# Patient Record
Sex: MALE | Race: WHITE | NOT HISPANIC OR LATINO | Employment: FULL TIME | URBAN - METROPOLITAN AREA
[De-identification: names, ages, dates, MRNs, and addresses within clinical notes are randomized per-mention and may not be internally consistent; named-entity substitution may affect disease eponyms.]

---

## 2018-11-07 ENCOUNTER — TELEPHONE (OUTPATIENT)
Dept: SURGERY | Facility: CLINIC | Age: 58
End: 2018-11-07

## 2018-11-07 NOTE — TELEPHONE ENCOUNTER
----- Message from Bandar Fuentes sent at 11/7/2018  9:58 AM CST -----  Contact: self   Patient want to speak with a nurse regarding upcoming appointment please call back at There are no phone numbers on file.

## 2018-11-16 ENCOUNTER — OFFICE VISIT (OUTPATIENT)
Dept: SURGERY | Facility: CLINIC | Age: 58
End: 2018-11-16
Payer: COMMERCIAL

## 2018-11-16 VITALS — HEIGHT: 70 IN | WEIGHT: 195.19 LBS | BODY MASS INDEX: 27.94 KG/M2

## 2018-11-16 DIAGNOSIS — K64.9 BLEEDING HEMORRHOIDS: Primary | ICD-10-CM

## 2018-11-16 PROCEDURE — 99243 OFF/OP CNSLTJ NEW/EST LOW 30: CPT | Mod: 25,S$GLB,, | Performed by: SURGERY

## 2018-11-16 PROCEDURE — 46600 DIAGNOSTIC ANOSCOPY SPX: CPT | Mod: S$GLB,,, | Performed by: SURGERY

## 2018-11-16 PROCEDURE — 99999 PR PBB SHADOW E&M-EST. PATIENT-LVL III: CPT | Mod: PBBFAC,,, | Performed by: SURGERY

## 2018-11-16 NOTE — PROGRESS NOTES
Subjective:       Patient ID: Enrique Yousif is a 58 y.o. male.    Chief Complaint: Hemorrhoids (Bleeding hemorrhoids)    HPI  Pleasant 59 yo M referred to me in consultation from Dr Matthews for evaluation of his hemorrhoids. Pt notes that he has been having issues with his hemorrhoids for the last several months. Notes that he frequently has bleeding in association with his hemorhroids.  NOtes that bleeding is always related to BM.  No fever/chills.  No changes in bowel habits.  Does note that tissue prolapses and at times can be painful.  Pt has had recent colonoscopy with Dr Matthews with no other findings noted.  Pt with no other medical or surgical problems  Review of Systems   Constitutional: Negative for activity change, appetite change, diaphoresis, fever and unexpected weight change.   Respiratory: Negative for cough, chest tightness and wheezing.    Cardiovascular: Negative for chest pain and palpitations.   Gastrointestinal: Positive for anal bleeding, blood in stool and rectal pain. Negative for abdominal distention, abdominal pain, constipation, diarrhea, nausea and vomiting.   Genitourinary: Negative for difficulty urinating, dysuria and hematuria.   Musculoskeletal: Negative for back pain and gait problem.   Skin: Negative for color change and wound.   Neurological: Negative for tremors and seizures.       Objective:      Physical Exam   Constitutional: He is oriented to person, place, and time. He appears well-developed and well-nourished.   HENT:   Head: Normocephalic and atraumatic.   Eyes: Pupils are equal, round, and reactive to light.   Neck: Normal range of motion. No tracheal deviation present. No thyromegaly present.   Cardiovascular: Normal rate, regular rhythm and normal heart sounds. Exam reveals no gallop and no friction rub.   No murmur heard.  Pulmonary/Chest: Effort normal and breath sounds normal. He has no wheezes. He exhibits no tenderness.   Abdominal: He exhibits no  distension and no mass. There is no tenderness. There is no rebound and no guarding.   Genitourinary:   Genitourinary Comments: Ext exam demonstrates no significant ext hemorrhoidal disease.  NIXON with normal sphincter tone. Anoscopy demonstrating large prolapsing int hemorrhoids in R ant, R post and L lateral position   Musculoskeletal: Normal range of motion.   Neurological: He is alert and oriented to person, place, and time. Coordination normal.   Skin: Skin is warm.   Psychiatric: He has a normal mood and affect.   Vitals reviewed.      Assessment:     Prolapsing bleeding hemorhroids  No diagnosis found.    Plan:       D/ w pt.  I have informed pt that he has large int hemorrhoids that are likely bleeding and prolapsing with straining. I have recommended banding as opposed to hemorrhoidectomy.  He desires to proceed with banding but has plans tomorrow.  He will RTC on Tuesday for banding

## 2018-11-16 NOTE — Clinical Note
I saw your patient, Enrique Yousif in the office.  Attached are my findings and plan.  Thank you for referring him to my office and if you have any questions please do not hesitate to call my cell [phone].Radhames Andrea

## 2018-11-20 ENCOUNTER — OFFICE VISIT (OUTPATIENT)
Dept: SURGERY | Facility: CLINIC | Age: 58
End: 2018-11-20
Payer: COMMERCIAL

## 2018-11-20 VITALS
BODY MASS INDEX: 27.21 KG/M2 | TEMPERATURE: 98 F | DIASTOLIC BLOOD PRESSURE: 78 MMHG | SYSTOLIC BLOOD PRESSURE: 137 MMHG | HEIGHT: 70 IN | WEIGHT: 190.06 LBS | HEART RATE: 84 BPM

## 2018-11-20 DIAGNOSIS — K64.9 BLEEDING HEMORRHOIDS: Primary | ICD-10-CM

## 2018-11-20 PROCEDURE — 99999 PR PBB SHADOW E&M-EST. PATIENT-LVL III: CPT | Mod: PBBFAC,,, | Performed by: SURGERY

## 2018-11-20 PROCEDURE — 99211 OFF/OP EST MAY X REQ PHY/QHP: CPT | Mod: 25,S$GLB,, | Performed by: SURGERY

## 2018-11-20 PROCEDURE — 46221 LIGATION OF HEMORRHOID(S): CPT | Mod: S$GLB,,, | Performed by: SURGERY

## 2018-11-20 NOTE — PROGRESS NOTES
Pleasant 59 yo M returns for banding.  Notes bleedind and perianal discomfort.  '    No changes since being seen last week    AFVSS  AAox3  Soft/nt/nd  Rectal: large in hemorrhoids in all columns.      A/P: Prolapsing int hemorrhoids    D/w pt. I have recommended and offered banding which he desires.      Having received informed consent pt was placed in prone jackknife position. I then placed rubber bands on the L lateral column, R Ant and R post columns without event. Bleeding was minimal and pt tolerated the procedure well.      Pt will RTC in 6 weeks for evaluation

## 2018-12-18 ENCOUNTER — OFFICE VISIT (OUTPATIENT)
Dept: SURGERY | Facility: CLINIC | Age: 58
End: 2018-12-18
Payer: COMMERCIAL

## 2018-12-18 VITALS
HEIGHT: 70 IN | TEMPERATURE: 99 F | WEIGHT: 191.13 LBS | BODY MASS INDEX: 27.36 KG/M2 | DIASTOLIC BLOOD PRESSURE: 77 MMHG | SYSTOLIC BLOOD PRESSURE: 138 MMHG | HEART RATE: 88 BPM

## 2018-12-18 DIAGNOSIS — K64.9 BLEEDING HEMORRHOIDS: Primary | ICD-10-CM

## 2018-12-18 PROCEDURE — 99212 OFFICE O/P EST SF 10 MIN: CPT | Mod: 25,S$GLB,, | Performed by: SURGERY

## 2018-12-18 PROCEDURE — 99999 PR PBB SHADOW E&M-EST. PATIENT-LVL III: CPT | Mod: PBBFAC,,, | Performed by: SURGERY

## 2018-12-18 PROCEDURE — 46221 LIGATION OF HEMORRHOID(S): CPT | Mod: S$GLB,,, | Performed by: SURGERY

## 2018-12-18 PROCEDURE — 3008F BODY MASS INDEX DOCD: CPT | Mod: CPTII,S$GLB,, | Performed by: SURGERY

## 2018-12-18 NOTE — PROGRESS NOTES
Pleasant 59 yo M whom I am familiar with.  Pt has had previous banding about 5 weeks ago.  He notes that he responded well but has had some continued bleeding.  He desires repeat banding.       Wt Readings from Last 3 Encounters:   12/18/18 86.7 kg (191 lb 2.2 oz)   11/20/18 86.2 kg (190 lb 0.6 oz)   11/16/18 88.5 kg (195 lb 3.2 oz)     Temp Readings from Last 3 Encounters:   12/18/18 98.5 °F (36.9 °C) (Oral)   11/20/18 98 °F (36.7 °C) (Oral)     BP Readings from Last 3 Encounters:   12/18/18 138/77   11/20/18 137/78     Pulse Readings from Last 3 Encounters:   12/18/18 88   11/20/18 84     AAOx3  CTA B  Soft/nt/nd  Rectal: ext exam with no significant ext hemorrhoids.  NIXON with normal sphincter tone. ANoscoyp demonstrating large int hemorrhoids greatest in the L lateral and R ant position    A/P: Large internal hemorrhoids    D/w pt.  Reviewed risks of banding today with pt.  He desires to proceed.      Having received informed consent pt was placed in prone jackknife position. I then placed rubber bands on the L lateral column, R Ant and R post columns without event. Bleeding was minimal and pt tolerated the procedure well.    Pt will RTC in 6 weeks.

## 2020-04-22 ENCOUNTER — HOSPITAL ENCOUNTER (OUTPATIENT)
Dept: PREADMISSION TESTING | Facility: HOSPITAL | Age: 60
Discharge: HOME | End: 2020-04-22
Attending: SPECIALIST
Payer: COMMERCIAL

## 2020-04-22 ENCOUNTER — HOSPITAL ENCOUNTER (OUTPATIENT)
Dept: RADIOLOGY | Facility: HOSPITAL | Age: 60
Discharge: HOME | End: 2020-04-22
Attending: SPECIALIST
Payer: COMMERCIAL

## 2020-04-22 ENCOUNTER — LAB VISIT (OUTPATIENT)
Dept: LAB | Facility: HOSPITAL | Age: 60
End: 2020-04-22
Attending: SPECIALIST
Payer: COMMERCIAL

## 2020-04-22 DIAGNOSIS — N20.1 LEFT URETERAL STONE: Primary | ICD-10-CM

## 2020-04-22 DIAGNOSIS — Z01.811 PRE-OP CHEST EXAM: ICD-10-CM

## 2020-04-22 DIAGNOSIS — Z01.818 PREOPERATIVE TESTING: ICD-10-CM

## 2020-04-22 DIAGNOSIS — Z01.811 PRE-OP CHEST EXAM: Primary | ICD-10-CM

## 2020-04-22 DIAGNOSIS — Z01.818 PRE-OP EVALUATION: ICD-10-CM

## 2020-04-22 DIAGNOSIS — Z01.818 PRE-OP EVALUATION: Primary | ICD-10-CM

## 2020-04-22 LAB
ALBUMIN SERPL BCP-MCNC: 4.4 G/DL (ref 3.5–5.2)
ALP SERPL-CCNC: 61 U/L (ref 55–135)
ALT SERPL W/O P-5'-P-CCNC: 24 U/L (ref 10–44)
ANION GAP SERPL CALC-SCNC: 7 MMOL/L (ref 8–16)
APTT PPP: 29.8 SEC (ref 23.6–33.3)
AST SERPL-CCNC: 25 U/L (ref 10–40)
BACTERIA #/AREA URNS HPF: NEGATIVE /HPF
BASOPHILS # BLD AUTO: 0.03 K/UL (ref 0–0.2)
BASOPHILS NFR BLD: 0.3 % (ref 0–1.9)
BILIRUB SERPL-MCNC: 0.7 MG/DL (ref 0.1–1)
BILIRUB UR QL STRIP: NEGATIVE
BUN SERPL-MCNC: 20 MG/DL (ref 8–23)
CALCIUM SERPL-MCNC: 9.6 MG/DL (ref 8.7–10.5)
CHLORIDE SERPL-SCNC: 107 MMOL/L (ref 95–110)
CLARITY UR: CLEAR
CO2 SERPL-SCNC: 31 MMOL/L (ref 23–29)
COLOR UR: YELLOW
CREAT SERPL-MCNC: 1.2 MG/DL (ref 0.5–1.4)
DIFFERENTIAL METHOD: ABNORMAL
EOSINOPHIL # BLD AUTO: 0.2 K/UL (ref 0–0.5)
EOSINOPHIL NFR BLD: 2.3 % (ref 0–8)
ERYTHROCYTE [DISTWIDTH] IN BLOOD BY AUTOMATED COUNT: 12.8 % (ref 11.5–14.5)
EST. GFR  (AFRICAN AMERICAN): >60 ML/MIN/1.73 M^2
EST. GFR  (NON AFRICAN AMERICAN): >60 ML/MIN/1.73 M^2
GLUCOSE SERPL-MCNC: 126 MG/DL (ref 70–110)
GLUCOSE UR QL STRIP: NEGATIVE
HCT VFR BLD AUTO: 40.9 % (ref 40–54)
HGB BLD-MCNC: 13.6 G/DL (ref 14–18)
HGB UR QL STRIP: ABNORMAL
HYALINE CASTS #/AREA URNS LPF: 19 /LPF
IMM GRANULOCYTES # BLD AUTO: 0.04 K/UL (ref 0–0.04)
IMM GRANULOCYTES NFR BLD AUTO: 0.5 % (ref 0–0.5)
INR PPP: 1.1
KETONES UR QL STRIP: NEGATIVE
LEUKOCYTE ESTERASE UR QL STRIP: NEGATIVE
LYMPHOCYTES # BLD AUTO: 1.8 K/UL (ref 1–4.8)
LYMPHOCYTES NFR BLD: 20.7 % (ref 18–48)
MCH RBC QN AUTO: 30.4 PG (ref 27–31)
MCHC RBC AUTO-ENTMCNC: 33.3 G/DL (ref 32–36)
MCV RBC AUTO: 91 FL (ref 82–98)
MICROSCOPIC COMMENT: ABNORMAL
MONOCYTES # BLD AUTO: 0.7 K/UL (ref 0.3–1)
MONOCYTES NFR BLD: 8 % (ref 4–15)
NEUTROPHILS # BLD AUTO: 6 K/UL (ref 1.8–7.7)
NEUTROPHILS NFR BLD: 68.2 % (ref 38–73)
NITRITE UR QL STRIP: NEGATIVE
NRBC BLD-RTO: 0 /100 WBC
PH UR STRIP: 6 [PH] (ref 5–8)
PLATELET # BLD AUTO: 192 K/UL (ref 150–350)
PMV BLD AUTO: 9.7 FL (ref 9.2–12.9)
POTASSIUM SERPL-SCNC: 3.8 MMOL/L (ref 3.5–5.1)
PROT SERPL-MCNC: 7.4 G/DL (ref 6–8.4)
PROT UR QL STRIP: ABNORMAL
PROTHROMBIN TIME: 13.3 SEC (ref 10.6–14.8)
RBC # BLD AUTO: 4.48 M/UL (ref 4.6–6.2)
RBC #/AREA URNS HPF: 4 /HPF (ref 0–4)
SODIUM SERPL-SCNC: 145 MMOL/L (ref 136–145)
SP GR UR STRIP: 1.02 (ref 1–1.03)
SQUAMOUS #/AREA URNS HPF: 3 /HPF
URN SPEC COLLECT METH UR: ABNORMAL
UROBILINOGEN UR STRIP-ACNC: NEGATIVE EU/DL
WBC # BLD AUTO: 8.82 K/UL (ref 3.9–12.7)
WBC #/AREA URNS HPF: 11 /HPF (ref 0–5)

## 2020-04-22 PROCEDURE — 93005 ELECTROCARDIOGRAM TRACING: CPT | Performed by: INTERNAL MEDICINE

## 2020-04-22 PROCEDURE — 71046 X-RAY EXAM CHEST 2 VIEWS: CPT | Mod: TC

## 2020-04-22 PROCEDURE — 36415 COLL VENOUS BLD VENIPUNCTURE: CPT

## 2020-04-22 PROCEDURE — 85730 THROMBOPLASTIN TIME PARTIAL: CPT

## 2020-04-22 PROCEDURE — 80053 COMPREHEN METABOLIC PANEL: CPT

## 2020-04-22 PROCEDURE — 85025 COMPLETE CBC W/AUTO DIFF WBC: CPT

## 2020-04-22 PROCEDURE — 85610 PROTHROMBIN TIME: CPT

## 2020-04-22 PROCEDURE — 81001 URINALYSIS AUTO W/SCOPE: CPT

## 2020-04-22 RX ORDER — DOXYCYCLINE 100 MG/1
100 CAPSULE ORAL 2 TIMES DAILY
COMMUNITY
End: 2020-06-02

## 2020-04-22 RX ORDER — ONDANSETRON HYDROCHLORIDE 8 MG/1
8 TABLET, FILM COATED ORAL
COMMUNITY
End: 2020-06-02

## 2020-04-22 RX ORDER — TAMSULOSIN HYDROCHLORIDE 0.4 MG/1
0.4 CAPSULE ORAL DAILY
COMMUNITY
End: 2020-06-02

## 2020-04-22 RX ORDER — ACETAMINOPHEN AND CODEINE PHOSPHATE 300; 60 MG/1; MG/1
1 TABLET ORAL EVERY 4 HOURS PRN
COMMUNITY
End: 2020-05-06

## 2020-04-22 NOTE — DISCHARGE INSTRUCTIONS
To confirm, Your doctor has instructed you that surgery is scheduled for:     Please report to [facility] on [address] the morning of surgery.     Pre-Op will call the afternoon prior to surgery between 4:00 and 6:00 PM with the final arrival time.      PLEASE NOTE:  The surgery schedule has many variables which may affect the time of your surgery case.  Family members should be available if your surgery time changes.  Plan to be here the day of your procedure between 4-6 hours.    MEDICATIONS:  TAKE ONLY THESE MEDICATIONS WITH A SMALL SIP OF WATER THE MORNING OF YOUR PROCEDURE:    Tylenol with codeine, Zofran and Flomax      DO NOT TAKE THESE MEDICATIONS 5-7 DAYS PRIOR to your procedure or per your surgeon's request: ASPIRIN, ALEVE, ADVIL, IBUPROFEN, FISH OIL VITAMIN E, HERBALS  (May take Tylenol)    ONLY if you are prescribed any types of blood thinners such as:  Aspirin, Coumadin, Plavix, Pradaxa, Xarelto, Aggrenox, Effient, Eliquis, Savasya, Brilinta, or any other, ask your surgeon whether you should stop taking them and how long before surgery you should stop.  You may also need to verify with the prescribing physician if it is ok to stop your medication.      INSTRUCTIONS IMPORTANT!!  · Do not eat or drink anything between midnight and the time of your procedure- this includes gum, mints, and candy.  · ONLY if you are diabetic, check your sugar in the morning before your procedure.  · Do not smoke, vape or drink alcoholic beverages 24 hours prior to your procedure.  · Shower the night before AND the morning of your procedure with a Chlorhexidine wash such as Hibiclens or Dial antibacterial soap from the neck down.  Do not get it on your face or in your eyes.  You may use your own shampoo and face wash. This helps your skin to be as bacteria free as possible.    · If you wear contact lenses, dentures, hearing aids or glasses, bring a container to put them in during surgery and give to a family member  for safe keeping.  Please leave all jewelry, piercing's and valuables at home.   · DO NOT remove hair from the surgery site.  Do not shave the incision site unless you are given specific instructions to do so.    · ONLY if you have been diagnosed with sleep apnea please bring your C-PAP machine.  · ONLY if you wear home oxygen please bring your portable oxygen tank the day of your procedure.   · ONLY for patients requiring bowel prep, written instructions will be given by your doctor's office.  · ONLY if you have a neuro stimulator, please bring the controller with you the morning of surgery  · ONLY if a type and screen test is needed before surgery, please return:  · If your doctor has scheduled you for an overnight stay, bring a small overnight bag with any personal items you need.  · Make arrangements in advance for transportation home by a responsible adult.  · You must make arrangements for transportation, TAXI'S, UBER'S OR LYFTS ARE NOT ALLOWED.          If you have any questions about these instructions, call Pre-Op Admit  Nursing at [phone] or the Pre-Op Day Surgery Unit at [phone].

## 2020-04-23 ENCOUNTER — ANESTHESIA EVENT (OUTPATIENT)
Dept: SURGERY | Facility: HOSPITAL | Age: 60
End: 2020-04-23
Payer: COMMERCIAL

## 2020-04-23 ENCOUNTER — HOSPITAL ENCOUNTER (OUTPATIENT)
Dept: RADIOLOGY | Facility: HOSPITAL | Age: 60
Discharge: HOME | End: 2020-04-23
Attending: SPECIALIST | Admitting: SPECIALIST
Payer: COMMERCIAL

## 2020-04-23 ENCOUNTER — HOSPITAL ENCOUNTER (OUTPATIENT)
Facility: HOSPITAL | Age: 60
Discharge: HOME | End: 2020-04-23
Attending: SPECIALIST | Admitting: SPECIALIST
Payer: COMMERCIAL

## 2020-04-23 ENCOUNTER — ANESTHESIA (OUTPATIENT)
Dept: SURGERY | Facility: HOSPITAL | Age: 60
End: 2020-04-23
Payer: COMMERCIAL

## 2020-04-23 VITALS
SYSTOLIC BLOOD PRESSURE: 136 MMHG | TEMPERATURE: 98 F | HEART RATE: 82 BPM | HEIGHT: 70 IN | OXYGEN SATURATION: 96 % | DIASTOLIC BLOOD PRESSURE: 76 MMHG | WEIGHT: 189.81 LBS | BODY MASS INDEX: 27.17 KG/M2 | RESPIRATION RATE: 18 BRPM

## 2020-04-23 DIAGNOSIS — N20.0 KIDNEY STONE: ICD-10-CM

## 2020-04-23 DIAGNOSIS — N20.1 LEFT URETERAL STONE: Primary | ICD-10-CM

## 2020-04-23 LAB — SARS-COV-2 RDRP RESP QL NAA+PROBE: NEGATIVE

## 2020-04-23 PROCEDURE — 71000015 HC POSTOP RECOV 1ST HR: Performed by: SPECIALIST

## 2020-04-23 PROCEDURE — 36000706: Performed by: SPECIALIST

## 2020-04-23 PROCEDURE — 63600175 PHARM REV CODE 636 W HCPCS: Performed by: NURSE ANESTHETIST, CERTIFIED REGISTERED

## 2020-04-23 PROCEDURE — 25000003 PHARM REV CODE 250: Performed by: NURSE ANESTHETIST, CERTIFIED REGISTERED

## 2020-04-23 PROCEDURE — 27202103: Performed by: ANESTHESIOLOGY

## 2020-04-23 PROCEDURE — 27201107 HC STYLET, STANDARD: Performed by: ANESTHESIOLOGY

## 2020-04-23 PROCEDURE — U0002 COVID-19 LAB TEST NON-CDC: HCPCS

## 2020-04-23 PROCEDURE — 27000671 HC TUBING MICROBORE EXT: Performed by: ANESTHESIOLOGY

## 2020-04-23 PROCEDURE — 27000673 HC TUBING BLOOD Y: Performed by: ANESTHESIOLOGY

## 2020-04-23 PROCEDURE — C1769 GUIDE WIRE: HCPCS | Performed by: SPECIALIST

## 2020-04-23 PROCEDURE — 82360 CALCULUS ASSAY QUANT: CPT

## 2020-04-23 PROCEDURE — 37000009 HC ANESTHESIA EA ADD 15 MINS: Performed by: SPECIALIST

## 2020-04-23 PROCEDURE — 37000008 HC ANESTHESIA 1ST 15 MINUTES: Performed by: SPECIALIST

## 2020-04-23 PROCEDURE — 63600175 PHARM REV CODE 636 W HCPCS: Performed by: SPECIALIST

## 2020-04-23 PROCEDURE — 27201423 OPTIME MED/SURG SUP & DEVICES STERILE SUPPLY: Performed by: SPECIALIST

## 2020-04-23 PROCEDURE — 71000033 HC RECOVERY, INTIAL HOUR: Performed by: SPECIALIST

## 2020-04-23 PROCEDURE — S0028 INJECTION, FAMOTIDINE, 20 MG: HCPCS | Performed by: NURSE ANESTHETIST, CERTIFIED REGISTERED

## 2020-04-23 PROCEDURE — 36000707: Performed by: SPECIALIST

## 2020-04-23 PROCEDURE — 71000016 HC POSTOP RECOV ADDL HR: Performed by: SPECIALIST

## 2020-04-23 PROCEDURE — 74420 UROGRAPHY RTRGR +-KUB: CPT | Mod: TC

## 2020-04-23 PROCEDURE — 27000654 HC CATH IV JELCO: Performed by: ANESTHESIOLOGY

## 2020-04-23 PROCEDURE — C2617 STENT, NON-COR, TEM W/O DEL: HCPCS | Performed by: SPECIALIST

## 2020-04-23 DEVICE — STENT URETERAL FIRM 4.8FX26 ASCERTA: Type: IMPLANTABLE DEVICE | Site: URETER | Status: FUNCTIONAL

## 2020-04-23 RX ORDER — MIDAZOLAM HYDROCHLORIDE 1 MG/ML
INJECTION INTRAMUSCULAR; INTRAVENOUS
Status: DISCONTINUED | OUTPATIENT
Start: 2020-04-23 | End: 2020-04-23

## 2020-04-23 RX ORDER — FENTANYL CITRATE 50 UG/ML
INJECTION, SOLUTION INTRAMUSCULAR; INTRAVENOUS
Status: DISCONTINUED | OUTPATIENT
Start: 2020-04-23 | End: 2020-04-23

## 2020-04-23 RX ORDER — ACETAMINOPHEN 10 MG/ML
INJECTION, SOLUTION INTRAVENOUS
Status: DISCONTINUED | OUTPATIENT
Start: 2020-04-23 | End: 2020-04-23

## 2020-04-23 RX ORDER — LIDOCAINE HYDROCHLORIDE 20 MG/ML
INJECTION, SOLUTION EPIDURAL; INFILTRATION; INTRACAUDAL; PERINEURAL
Status: DISCONTINUED | OUTPATIENT
Start: 2020-04-23 | End: 2020-04-23

## 2020-04-23 RX ORDER — SODIUM CHLORIDE, SODIUM LACTATE, POTASSIUM CHLORIDE, CALCIUM CHLORIDE 600; 310; 30; 20 MG/100ML; MG/100ML; MG/100ML; MG/100ML
INJECTION, SOLUTION INTRAVENOUS CONTINUOUS PRN
Status: DISCONTINUED | OUTPATIENT
Start: 2020-04-23 | End: 2020-04-23

## 2020-04-23 RX ORDER — ONDANSETRON 2 MG/ML
INJECTION INTRAMUSCULAR; INTRAVENOUS
Status: DISCONTINUED | OUTPATIENT
Start: 2020-04-23 | End: 2020-04-23

## 2020-04-23 RX ORDER — HYDROCODONE BITARTRATE AND ACETAMINOPHEN 5; 325 MG/1; MG/1
1 TABLET ORAL EVERY 4 HOURS PRN
Qty: 14 TABLET | Refills: 0
Start: 2020-04-23 | End: 2020-06-02

## 2020-04-23 RX ORDER — HYDROCODONE BITARTRATE AND ACETAMINOPHEN 5; 325 MG/1; MG/1
1 TABLET ORAL EVERY 4 HOURS PRN
Status: DISCONTINUED | OUTPATIENT
Start: 2020-04-23 | End: 2020-04-23 | Stop reason: HOSPADM

## 2020-04-23 RX ORDER — SUCCINYLCHOLINE CHLORIDE 20 MG/ML
INJECTION INTRAMUSCULAR; INTRAVENOUS
Status: DISCONTINUED | OUTPATIENT
Start: 2020-04-23 | End: 2020-04-23

## 2020-04-23 RX ORDER — PROPOFOL 10 MG/ML
VIAL (ML) INTRAVENOUS
Status: DISCONTINUED | OUTPATIENT
Start: 2020-04-23 | End: 2020-04-23

## 2020-04-23 RX ORDER — ROCURONIUM BROMIDE 10 MG/ML
INJECTION, SOLUTION INTRAVENOUS
Status: DISCONTINUED | OUTPATIENT
Start: 2020-04-23 | End: 2020-04-23

## 2020-04-23 RX ORDER — FAMOTIDINE 10 MG/ML
INJECTION INTRAVENOUS
Status: DISCONTINUED | OUTPATIENT
Start: 2020-04-23 | End: 2020-04-23

## 2020-04-23 RX ORDER — LEVOFLOXACIN 5 MG/ML
500 INJECTION, SOLUTION INTRAVENOUS ONCE
Status: COMPLETED | OUTPATIENT
Start: 2020-04-23 | End: 2020-04-23

## 2020-04-23 RX ORDER — LIDOCAINE HYDROCHLORIDE 20 MG/ML
JELLY TOPICAL
Status: DISCONTINUED | OUTPATIENT
Start: 2020-04-23 | End: 2020-04-23

## 2020-04-23 RX ADMIN — ROCURONIUM BROMIDE 5 MG: 10 INJECTION, SOLUTION INTRAVENOUS at 11:04

## 2020-04-23 RX ADMIN — PROPOFOL 150 MG: 10 INJECTION, EMULSION INTRAVENOUS at 11:04

## 2020-04-23 RX ADMIN — ACETAMINOPHEN 1000 MG: 10 INJECTION, SOLUTION INTRAVENOUS at 11:04

## 2020-04-23 RX ADMIN — FENTANYL CITRATE 50 MCG: 50 INJECTION INTRAMUSCULAR; INTRAVENOUS at 11:04

## 2020-04-23 RX ADMIN — SODIUM CHLORIDE, SODIUM LACTATE, POTASSIUM CHLORIDE, AND CALCIUM CHLORIDE: .6; .31; .03; .02 INJECTION, SOLUTION INTRAVENOUS at 10:04

## 2020-04-23 RX ADMIN — SUCCINYLCHOLINE CHLORIDE 120 MG: 20 INJECTION, SOLUTION INTRAMUSCULAR; INTRAVENOUS at 11:04

## 2020-04-23 RX ADMIN — FAMOTIDINE 20 MG: 10 INJECTION, SOLUTION INTRAVENOUS at 11:04

## 2020-04-23 RX ADMIN — LIDOCAINE HYDROCHLORIDE 100 MG: 20 INJECTION, SOLUTION INTRAVENOUS at 11:04

## 2020-04-23 RX ADMIN — LIDOCAINE HYDROCHLORIDE 3 ML: 20 JELLY TOPICAL at 11:04

## 2020-04-23 RX ADMIN — ONDANSETRON 4 MG: 2 INJECTION INTRAMUSCULAR; INTRAVENOUS at 10:04

## 2020-04-23 RX ADMIN — LEVOFLOXACIN 500 MG: 500 INJECTION, SOLUTION INTRAVENOUS at 10:04

## 2020-04-23 RX ADMIN — MIDAZOLAM HYDROCHLORIDE 2 MG: 1 INJECTION, SOLUTION INTRAMUSCULAR; INTRAVENOUS at 10:04

## 2020-04-23 NOTE — TRANSFER OF CARE
"Anesthesia Transfer of Care Note    Patient: Enrique Yousif    Procedure(s) Performed: Procedure(s) (LRB):  CYSTOSCOPY, WITH CALCULUS REMOVAL (Left)  URETEROSCOPY (N/A)    Patient location: PACU    Anesthesia Type: general    Transport from OR: Transported from OR on room air with adequate spontaneous ventilation    Post pain: adequate analgesia    Post assessment: no apparent anesthetic complications and tolerated procedure well    Post vital signs: stable    Level of consciousness: awake, alert and oriented    Nausea/Vomiting: no nausea/vomiting    Complications: none    Transfer of care protocol was followed      Last vitals:   Visit Vitals  BP (!) 149/92 (BP Location: Right arm, Patient Position: Lying)   Pulse 86   Temp 36.8 °C (98.2 °F) (Oral)   Resp 20   Ht 5' 10" (1.778 m)   Wt 86.1 kg (189 lb 13.1 oz)   SpO2 98%   BMI 27.24 kg/m²     "

## 2020-04-23 NOTE — ANESTHESIA PREPROCEDURE EVALUATION
04/23/2020  Enrique Yousif is a 59 y.o., male.    There is no problem list on file for this patient.      Past Surgical History:   Procedure Laterality Date    APPENDECTOMY  23yrs old    Right Knee Orthoscopic Surgery  2014    Right Shoulder Rotator Cuff  2014    TONSILLECTOMY  Age 33yrs old        Tobacco Use:  The patient  reports that he has never smoked. He has never used smokeless tobacco.     Results for orders placed or performed during the hospital encounter of 04/22/20   EKG 12-lead    Collection Time: 04/22/20  3:42 PM    Narrative    Test Reason : Z01.818,    Vent. Rate : 100 BPM     Atrial Rate : 100 BPM     P-R Int : 192 ms          QRS Dur : 128 ms      QT Int : 372 ms       P-R-T Axes : 080 072 063 degrees     QTc Int : 479 ms    Normal sinus rhythm  Right bundle branch block  Abnormal ECG  No previous ECGs available  Confirmed by Osvaldo Espinal MD ([id number]) on 4/22/2020 6:20:36 PM    Referred By: ALEJANDRA ACOSTA           Confirmed By:Osvaldo Espinal MD             Lab Results   Component Value Date    WBC 8.82 04/22/2020    HGB 13.6 (L) 04/22/2020    HCT 40.9 04/22/2020    MCV 91 04/22/2020     04/22/2020     BMP  Lab Results   Component Value Date     04/22/2020    K 3.8 04/22/2020     04/22/2020    CO2 31 (H) 04/22/2020    BUN 20 04/22/2020    CREATININE 1.2 04/22/2020    CALCIUM 9.6 04/22/2020    ANIONGAP 7 (L) 04/22/2020    ESTGFRAFRICA >60.0 04/22/2020    EGFRNONAA >60.0 04/22/2020             Pre-op Assessment    I have reviewed the Patient Summary Reports.    I have reviewed the Nursing Notes.   I have reviewed the Medications.     Review of Systems  Anesthesia Hx:  No problems with previous Anesthesia  History of prior surgery of interest to airway management or planning: Previous anesthesia: General Denies Family Hx of Anesthesia complications.   Denies  Personal Hx of Anesthesia complications.   Social:  Non-Smoker    Hematology/Oncology:  Hematology Normal        Cardiovascular:  Cardiovascular Normal     Pulmonary:  Pulmonary Normal    Renal/:   renal calculi    Hepatic/GI:  Hepatic/GI Normal    Musculoskeletal:  Musculoskeletal Normal    Neurological:  Neurology Normal    Endocrine:  Endocrine Normal        Physical Exam  General:  Well nourished    Airway/Jaw/Neck:  Airway Findings: Mouth Opening: Normal Tongue: Normal  General Airway Assessment: Adult  Mallampati: III  TM Distance: Normal, at least 6 cm  Jaw/Neck Findings:  Neck ROM: Normal ROM       Chest/Lungs:  Chest/Lungs Findings: Clear to auscultation, Normal Respiratory Rate     Heart/Vascular:  Heart Findings: Rate: Normal  Rhythm: Regular Rhythm  Sounds: Normal     Abdomen:  Abdomen Findings: Normal    Musculoskeletal:  Musculoskeletal Findings: Normal   Skin:  Skin Findings: Normal    Mental Status:  Mental Status Findings:  Cooperative, Alert and Oriented         Anesthesia Plan  Type of Anesthesia, risks & benefits discussed:  Anesthesia Type:  general  Patient's Preference:   Intra-op Monitoring Plan: standard ASA monitors  Intra-op Monitoring Plan Comments:   Post Op Pain Control Plan: multimodal analgesia  Post Op Pain Control Plan Comments:   Induction:   IV  Beta Blocker:  Patient is not currently on a Beta-Blocker (No further documentation required).       Informed Consent: Patient understands risks and agrees with Anesthesia plan.  Questions answered. Anesthesia consent signed with patient.  ASA Score: 2     Day of Surgery Review of History & Physical: I have interviewed and examined the patient. I have reviewed the patient's H&P dated:  There are no significant changes.      Anesthesia Plan Notes: RIGOBERTO Renteria intra op  Zofran

## 2020-04-23 NOTE — ANESTHESIA POSTPROCEDURE EVALUATION
Anesthesia Post Evaluation    Patient: Enrique Yousif    Procedure(s) Performed: Procedure(s) (LRB):  CYSTOSCOPY, WITH CALCULUS REMOVAL (Left)  URETEROSCOPY (N/A)    Final Anesthesia Type: general    Patient location during evaluation: PACU  Patient participation: Yes- Able to Participate  Level of consciousness: awake and alert and oriented  Post-procedure vital signs: reviewed and stable  Pain management: adequate  Airway patency: patent    PONV status at discharge: No PONV  Anesthetic complications: no      Cardiovascular status: blood pressure returned to baseline and hemodynamically stable  Respiratory status: unassisted, spontaneous ventilation and room air  Hydration status: euvolemic  Follow-up not needed.          Vitals Value Taken Time   /87 4/23/2020 12:00 PM   Temp 36.4 °C (97.5 °F) 4/23/2020 12:00 PM   Pulse 86 4/23/2020 12:05 PM   Resp 14 4/23/2020 12:05 PM   SpO2 98 % 4/23/2020 12:05 PM   Vitals shown include unvalidated device data.      No case tracking events are documented in the log.      Pain/Valeria Score: Valeria Score: 10 (4/23/2020 11:45 AM)

## 2020-04-23 NOTE — DISCHARGE SUMMARY
Patient comes in for ureteroscopic stone extraction  The stone is removed easily  Stent is left in place  Procedure is done w no complication    After a brief stay in recovery, patient is discharged in good condition    Meds given:  Lortab    F/erick office:  1 week with lou MURPHY

## 2020-04-23 NOTE — OP NOTE
Operative Note         SUMMARY     Surgery Date:  4/23/2020     Assistant:     Indications:  59-year-old male with left ureteral stone  Here for stone extraction  He continues to have pain and discomfort      Pre-op Diagnosis:   Left ureteral stone    Post-op Diagnosis:  Same    Procedure:  Cystoscopy with cysto ureteral stone extraction  Double-J stent    Anesthesia: General    Description of Procedure:   Patient brought to cystoscopy suite.  Placed in the cystoscopy position.  Patient is prepped and draped.  Anesthesia is given.  We enter the urinary bladder with the 21 fr cystoscope.  Bladder is inspected no lesions were found  We cannulate the left ureter with a cone-tip catheter and inject contrast  Filling defect is identified  Stone is extracted with a basket  We then leave a stent in  Stent is placed over the wire  Position is excellent  Telescope was removed    Findings/Key Components:      Successful stone extraction  Plan a follow-up in a week to remove the stent    Estimated Blood Loss:        None

## 2020-04-23 NOTE — ANESTHESIA PROCEDURE NOTES
Intubation  Performed by: Elizabeth Servin CRNA  Authorized by: Kyle Sage MD     Intubation:     Induction:  Intravenous    Intubated:  Postinduction    Mask Ventilation:  Easy mask    Attempts:  1    Attempted By:  CRNA    Method of Intubation:  Direct    Blade:  Sage 2    Laryngeal View Grade: Grade I - full view of chords      Difficult Airway Encountered?: No      Complications:  None    Airway Device:  Oral endotracheal tube    Airway Device Size:  7.5    Style/Cuff Inflation:  Cuffed    Tube secured:  23    Secured at:  The lips    Placement Verified By:  Capnometry    Complicating Factors:  None    Findings Post-Intubation:  BS equal bilateral and atraumatic/condition of teeth unchanged

## 2020-04-23 NOTE — H&P
[facility]  History & Physical    SUBJECTIVE:     Chief Complaint/Reason for Admission:     History of Present Illness:  Patient is a 59 y.o. male presents with left ureteral stone  Pain for several days  Here for stone extraction    PTA Medications   Medication Sig    acetaminophen-codeine 300-60mg (TYLENOL #4) 300-60 mg Tab Take 1 tablet by mouth every 4 (four) hours as needed.    doxycycline (VIBRAMYCIN) 100 MG Cap Take 100 mg by mouth 2 (two) times daily.    Lactobacillus rhamnosus GG (CULTURELLE) 10 billion cell capsule Take 1 capsule by mouth once daily.    ondansetron (ZOFRAN) 8 MG tablet Take 8 mg by mouth every 8 (eight) hours.    tamsulosin (FLOMAX) 0.4 mg Cap Take 0.4 mg by mouth once daily.       Review of patient's allergies indicates:   Allergen Reactions    Penicillins Other (See Comments)     Flushed       History reviewed. No pertinent past medical history.  Past Surgical History:   Procedure Laterality Date    APPENDECTOMY  23yrs old    Right Knee Orthoscopic Surgery  2014    Right Shoulder Rotator Cuff  2014    TONSILLECTOMY  Age 33yrs old     History reviewed. No pertinent family history.  Social History     Tobacco Use    Smoking status: Never Smoker    Smokeless tobacco: Never Used   Substance Use Topics    Alcohol use: Not Currently    Drug use: Not on file        Review of Systems:  Negative    OBJECTIVE:     Vital Signs (Most Recent):  Temp: 98.3 °F (36.8 °C) (04/23/20 0914)  Pulse: 90 (04/23/20 0914)  Resp: 18 (04/23/20 0914)  BP: (!) 142/86 (04/23/20 0919)  SpO2: 96 % (04/23/20 0914)    Inpatient Medications:  Current Facility-Administered Medications   Medication Dose Route Frequency Provider Last Rate Last Dose    levoFLOXacin 500 mg/100 mL IVPB 500 mg  500 mg Intravenous Once Derian Aguillon MD              ASSESSMENT/PLAN:       Left distal ureteral stone      Cysto stone extraction

## 2020-05-04 LAB
CALCULI COMPOSITION: NORMAL
CALCULI DISCLAIMER: NORMAL
CALCULI, PLEASE NOTE: NORMAL
COLOR STONE: NORMAL
COM MFR STONE: 100 %
LABORATORY COMMENT REPORT: NORMAL
PHOTO: NORMAL
SIZE STONE: NORMAL MM
WT STONE: 38 MG

## 2020-05-05 DIAGNOSIS — N20.1 CALCULUS OF URETER: Primary | ICD-10-CM

## 2020-05-06 ENCOUNTER — HOSPITAL ENCOUNTER (OUTPATIENT)
Dept: RADIOLOGY | Facility: HOSPITAL | Age: 60
Discharge: HOME | End: 2020-05-06
Attending: SPECIALIST
Payer: COMMERCIAL

## 2020-05-06 DIAGNOSIS — N20.1 CALCULUS OF URETER: ICD-10-CM

## 2020-05-06 PROCEDURE — 74018 RADEX ABDOMEN 1 VIEW: CPT | Mod: TC,PO

## 2020-05-07 ENCOUNTER — ANESTHESIA (OUTPATIENT)
Dept: SURGERY | Facility: HOSPITAL | Age: 60
End: 2020-05-07
Payer: COMMERCIAL

## 2020-05-07 ENCOUNTER — ANESTHESIA EVENT (OUTPATIENT)
Dept: SURGERY | Facility: HOSPITAL | Age: 60
End: 2020-05-07
Payer: COMMERCIAL

## 2020-05-07 ENCOUNTER — HOSPITAL ENCOUNTER (OUTPATIENT)
Facility: HOSPITAL | Age: 60
Discharge: HOME | End: 2020-05-07
Attending: SPECIALIST | Admitting: SPECIALIST
Payer: COMMERCIAL

## 2020-05-07 DIAGNOSIS — N20.0 RENAL STONES: Primary | ICD-10-CM

## 2020-05-07 LAB — SARS-COV-2 RDRP RESP QL NAA+PROBE: NEGATIVE

## 2020-05-07 PROCEDURE — 27200651 HC AIRWAY, LMA: Performed by: ANESTHESIOLOGY

## 2020-05-07 PROCEDURE — 37000008 HC ANESTHESIA 1ST 15 MINUTES: Performed by: SPECIALIST

## 2020-05-07 PROCEDURE — 27000671 HC TUBING MICROBORE EXT: Performed by: ANESTHESIOLOGY

## 2020-05-07 PROCEDURE — 71000015 HC POSTOP RECOV 1ST HR: Performed by: SPECIALIST

## 2020-05-07 PROCEDURE — 27202107 HC XP QUATRO SENSOR: Performed by: ANESTHESIOLOGY

## 2020-05-07 PROCEDURE — 27000284 HC CANNULA NASAL: Performed by: ANESTHESIOLOGY

## 2020-05-07 PROCEDURE — 63600175 PHARM REV CODE 636 W HCPCS: Performed by: NURSE ANESTHETIST, CERTIFIED REGISTERED

## 2020-05-07 PROCEDURE — 37000009 HC ANESTHESIA EA ADD 15 MINS: Performed by: SPECIALIST

## 2020-05-07 PROCEDURE — 71000033 HC RECOVERY, INTIAL HOUR: Performed by: SPECIALIST

## 2020-05-07 PROCEDURE — U0002 COVID-19 LAB TEST NON-CDC: HCPCS

## 2020-05-07 PROCEDURE — 36000704 HC OR TIME LEV I 1ST 15 MIN: Performed by: SPECIALIST

## 2020-05-07 PROCEDURE — 71000039 HC RECOVERY, EACH ADD'L HOUR: Performed by: SPECIALIST

## 2020-05-07 PROCEDURE — 27000673 HC TUBING BLOOD Y: Performed by: ANESTHESIOLOGY

## 2020-05-07 PROCEDURE — 25000003 PHARM REV CODE 250: Performed by: ANESTHESIOLOGY

## 2020-05-07 PROCEDURE — 25000003 PHARM REV CODE 250: Performed by: NURSE ANESTHETIST, CERTIFIED REGISTERED

## 2020-05-07 PROCEDURE — 36000705 HC OR TIME LEV I EA ADD 15 MIN: Performed by: SPECIALIST

## 2020-05-07 RX ORDER — LIDOCAINE HCL/PF 100 MG/5ML
SYRINGE (ML) INTRAVENOUS
Status: DISCONTINUED | OUTPATIENT
Start: 2020-05-07 | End: 2020-05-07

## 2020-05-07 RX ORDER — PROPOFOL 10 MG/ML
INJECTION, EMULSION INTRAVENOUS
Status: DISCONTINUED | OUTPATIENT
Start: 2020-05-07 | End: 2020-05-07

## 2020-05-07 RX ORDER — DIPHENHYDRAMINE HYDROCHLORIDE 50 MG/ML
12.5 INJECTION INTRAMUSCULAR; INTRAVENOUS EVERY 4 HOURS
Status: DISCONTINUED | OUTPATIENT
Start: 2020-05-07 | End: 2020-05-07 | Stop reason: HOSPADM

## 2020-05-07 RX ORDER — HYDROCODONE BITARTRATE AND ACETAMINOPHEN 5; 325 MG/1; MG/1
1 TABLET ORAL EVERY 4 HOURS PRN
Qty: 12 TABLET | Refills: 0
Start: 2020-05-07 | End: 2020-06-02

## 2020-05-07 RX ORDER — FENTANYL CITRATE 50 UG/ML
25 INJECTION, SOLUTION INTRAMUSCULAR; INTRAVENOUS
Status: DISCONTINUED | OUTPATIENT
Start: 2020-05-07 | End: 2020-05-07 | Stop reason: HOSPADM

## 2020-05-07 RX ORDER — ACETAMINOPHEN 10 MG/ML
INJECTION, SOLUTION INTRAVENOUS
Status: DISCONTINUED | OUTPATIENT
Start: 2020-05-07 | End: 2020-05-07

## 2020-05-07 RX ORDER — OXYCODONE HYDROCHLORIDE 5 MG/1
5 TABLET ORAL
Status: DISCONTINUED | OUTPATIENT
Start: 2020-05-07 | End: 2020-05-07 | Stop reason: HOSPADM

## 2020-05-07 RX ORDER — FENTANYL CITRATE 50 UG/ML
INJECTION, SOLUTION INTRAMUSCULAR; INTRAVENOUS
Status: DISCONTINUED | OUTPATIENT
Start: 2020-05-07 | End: 2020-05-07

## 2020-05-07 RX ORDER — OXYCODONE HYDROCHLORIDE 5 MG/1
5 TABLET ORAL EVERY 4 HOURS PRN
Status: DISCONTINUED | OUTPATIENT
Start: 2020-05-07 | End: 2020-05-07

## 2020-05-07 RX ORDER — SODIUM CHLORIDE, SODIUM LACTATE, POTASSIUM CHLORIDE, CALCIUM CHLORIDE 600; 310; 30; 20 MG/100ML; MG/100ML; MG/100ML; MG/100ML
INJECTION, SOLUTION INTRAVENOUS CONTINUOUS PRN
Status: DISCONTINUED | OUTPATIENT
Start: 2020-05-07 | End: 2020-05-07

## 2020-05-07 RX ORDER — LIDOCAINE HYDROCHLORIDE 20 MG/ML
JELLY TOPICAL
Status: DISCONTINUED | OUTPATIENT
Start: 2020-05-07 | End: 2020-05-07

## 2020-05-07 RX ORDER — SCOLOPAMINE TRANSDERMAL SYSTEM 1 MG/1
1 PATCH, EXTENDED RELEASE TRANSDERMAL
Status: DISCONTINUED | OUTPATIENT
Start: 2020-05-07 | End: 2020-05-07 | Stop reason: HOSPADM

## 2020-05-07 RX ORDER — DIPHENHYDRAMINE HYDROCHLORIDE 50 MG/ML
6.25 INJECTION INTRAMUSCULAR; INTRAVENOUS
Status: DISCONTINUED | OUTPATIENT
Start: 2020-05-07 | End: 2020-05-07 | Stop reason: HOSPADM

## 2020-05-07 RX ORDER — LEVOFLOXACIN 5 MG/ML
500 INJECTION, SOLUTION INTRAVENOUS
Status: DISCONTINUED | OUTPATIENT
Start: 2020-05-07 | End: 2020-05-07 | Stop reason: HOSPADM

## 2020-05-07 RX ORDER — ONDANSETRON 2 MG/ML
4 INJECTION INTRAMUSCULAR; INTRAVENOUS DAILY PRN
Status: DISCONTINUED | OUTPATIENT
Start: 2020-05-07 | End: 2020-05-07 | Stop reason: HOSPADM

## 2020-05-07 RX ORDER — ONDANSETRON 2 MG/ML
INJECTION INTRAMUSCULAR; INTRAVENOUS
Status: DISCONTINUED | OUTPATIENT
Start: 2020-05-07 | End: 2020-05-07

## 2020-05-07 RX ORDER — SODIUM CHLORIDE 0.9 % (FLUSH) 0.9 %
10 SYRINGE (ML) INJECTION
Status: DISCONTINUED | OUTPATIENT
Start: 2020-05-07 | End: 2020-05-07 | Stop reason: HOSPADM

## 2020-05-07 RX ORDER — ONDANSETRON 4 MG/1
4 TABLET, ORALLY DISINTEGRATING ORAL ONCE
Status: DISCONTINUED | OUTPATIENT
Start: 2020-05-07 | End: 2020-05-07 | Stop reason: HOSPADM

## 2020-05-07 RX ORDER — PHENYLEPHRINE HYDROCHLORIDE 10 MG/ML
INJECTION INTRAVENOUS
Status: DISCONTINUED | OUTPATIENT
Start: 2020-05-07 | End: 2020-05-07

## 2020-05-07 RX ORDER — DEXAMETHASONE SODIUM PHOSPHATE 4 MG/ML
4 INJECTION, SOLUTION INTRA-ARTICULAR; INTRALESIONAL; INTRAMUSCULAR; INTRAVENOUS; SOFT TISSUE ONCE
Status: DISCONTINUED | OUTPATIENT
Start: 2020-05-07 | End: 2020-05-07 | Stop reason: HOSPADM

## 2020-05-07 RX ADMIN — LIDOCAINE HYDROCHLORIDE 5 ML: 20 JELLY TOPICAL at 11:05

## 2020-05-07 RX ADMIN — ACETAMINOPHEN 1000 MG: 10 INJECTION, SOLUTION INTRAVENOUS at 11:05

## 2020-05-07 RX ADMIN — SODIUM CHLORIDE, SODIUM LACTATE, POTASSIUM CHLORIDE, AND CALCIUM CHLORIDE: .6; .31; .03; .02 INJECTION, SOLUTION INTRAVENOUS at 10:05

## 2020-05-07 RX ADMIN — FENTANYL CITRATE 50 MCG: 50 INJECTION INTRAMUSCULAR; INTRAVENOUS at 10:05

## 2020-05-07 RX ADMIN — PROPOFOL 150 MG: 10 INJECTION, EMULSION INTRAVENOUS at 11:05

## 2020-05-07 RX ADMIN — SCOPOLAMINE 1 PATCH: 1 PATCH, EXTENDED RELEASE TRANSDERMAL at 10:05

## 2020-05-07 RX ADMIN — PHENYLEPHRINE HYDROCHLORIDE 200 MCG: 10 INJECTION INTRAVENOUS at 11:05

## 2020-05-07 RX ADMIN — FENTANYL CITRATE 50 MCG: 50 INJECTION INTRAMUSCULAR; INTRAVENOUS at 11:05

## 2020-05-07 RX ADMIN — ONDANSETRON 4 MG: 2 INJECTION INTRAMUSCULAR; INTRAVENOUS at 10:05

## 2020-05-07 RX ADMIN — LIDOCAINE HYDROCHLORIDE 100 MG: 20 INJECTION, SOLUTION INTRAVENOUS at 11:05

## 2020-05-07 NOTE — H&P
[facility]  History & Physical    SUBJECTIVE:     Chief Complaint/Reason for Admission:     History of Present Illness:  Patient is a 59 y.o. male presents with a left renal stone 3 x 4 mm  Here today for outpatient lithotripsy  Patient had a ureteral stone removed several days ago    PTA Medications   Medication Sig    doxycycline (VIBRAMYCIN) 100 MG Cap Take 100 mg by mouth 2 (two) times daily.    HYDROcodone-acetaminophen (NORCO) 5-325 mg per tablet Take 1 tablet by mouth every 4 (four) hours as needed for Pain.    L.acidoph-Lthelmag-B.bif-S.therm (BACID) 1 billion cell- 250 mg Tab Take 1 capsule by mouth once daily.    ondansetron (ZOFRAN) 8 MG tablet Take 8 mg by mouth every 8 (eight) hours.    tamsulosin (FLOMAX) 0.4 mg Cap Take 0.4 mg by mouth once daily.       Review of patient's allergies indicates:   Allergen Reactions    Penicillins Other (See Comments)     Flushed       Past Medical History:   Diagnosis Date    Kidney stone 2020     Past Surgical History:   Procedure Laterality Date    APPENDECTOMY  23yrs old    CYSTOSCOPY WITH CALCULUS EXTRACTION Left 4/23/2020    Procedure: CYSTOSCOPY, WITH CALCULUS REMOVAL;  Surgeon: Derian Aguillon MD;  Location: [facility] OR;  Service: Urology;  Laterality: Left;    Right Knee Orthoscopic Surgery  2014    Right Shoulder Rotator Cuff  2014    TONSILLECTOMY  Age 33yrs old    URETEROSCOPY N/A 4/23/2020    Procedure: URETEROSCOPY;  Surgeon: Derian Aguillon MD;  Location: [facility] OR;  Service: Urology;  Laterality: N/A;     History reviewed. No pertinent family history.  Social History     Tobacco Use    Smoking status: Never Smoker    Smokeless tobacco: Never Used   Substance Use Topics    Alcohol use: Not Currently    Drug use: Not on file        Review of Systems:  Negative      OBJECTIVE:     Vital Signs (Most Recent):  Temp: 98.1 °F (36.7 °C) (05/07/20 0725)  Pulse: 85 (05/07/20 0725)  Resp: 17 (05/07/20 0725)  BP: (!) 148/85 (05/07/20  0725)  SpO2: 99 % (05/07/20 0725)    Inpatient Medications:  Current Facility-Administered Medications   Medication Dose Route Frequency Provider Last Rate Last Dose    dexamethasone injection 4 mg  4 mg Intravenous Once August Aviles MD        diphenhydrAMINE injection 12.5 mg  12.5 mg Intravenous Q4H August Aviles MD        diphenhydrAMINE injection 6.25 mg  6.25 mg Intravenous Q15 Min PRN August Aviles MD        fentaNYL injection 25 mcg  25 mcg Intravenous Q3 Min PRN August Aviles MD        levoFLOXacin 500 mg/100 mL IVPB 500 mg  500 mg Intravenous Q24H Derian Aguillon MD        ondansetron disintegrating tablet 4 mg  4 mg Oral Once August Aviles MD        ondansetron injection 4 mg  4 mg Intravenous Daily PRN August Aviles MD        oxyCODONE immediate release tablet 5 mg  5 mg Oral Q3H PRN August Aviles MD        oxyCODONE immediate release tablet 5 mg  5 mg Oral Q4H PRN August Aviles MD        scopolamine 1.3-1.5 mg (1 mg over 3 days) 1 patch  1 patch Transdermal Q3 Days August Aviles MD   1 patch at 05/07/20 1018    sodium chloride 0.9% flush 10 mL  10 mL Intravenous PRN August Aviles MD              ASSESSMENT/PLAN:         [facility]  History & Physical    SUBJECTIVE:     Chief Complaint/Reason for Admission:     History of Present Illness:  Patient is a 59 y.o. male presents with     PTA Medications   Medication Sig    doxycycline (VIBRAMYCIN) 100 MG Cap Take 100 mg by mouth 2 (two) times daily.    HYDROcodone-acetaminophen (NORCO) 5-325 mg per tablet Take 1 tablet by mouth every 4 (four) hours as needed for Pain.    L.acidoph-L.bulg-B.bif-S.therm (BACID) 1 billion cell- 250 mg Tab Take 1 capsule by mouth once daily.    ondansetron (ZOFRAN) 8 MG tablet Take 8 mg by mouth every 8 (eight) hours.    tamsulosin (FLOMAX) 0.4 mg Cap Take 0.4 mg by mouth once daily.       Review of patient's allergies indicates:   Allergen Reactions    Penicillins Other  (See Comments)     Flushed       Past Medical History:   Diagnosis Date    Kidney stone 2020     Past Surgical History:   Procedure Laterality Date    APPENDECTOMY  23yrs old    CYSTOSCOPY WITH CALCULUS EXTRACTION Left 4/23/2020    Procedure: CYSTOSCOPY, WITH CALCULUS REMOVAL;  Surgeon: Derian Aguillon MD;  Location: [facility];  Service: Urology;  Laterality: Left;    Right Knee Orthoscopic Surgery  2014    Right Shoulder Rotator Cuff  2014    TONSILLECTOMY  Age 33yrs old    URETEROSCOPY N/A 4/23/2020    Procedure: URETEROSCOPY;  Surgeon: Derian Aguillon MD;  Location: [facility];  Service: Urology;  Laterality: N/A;     History reviewed. No pertinent family history.  Social History     Tobacco Use    Smoking status: Never Smoker    Smokeless tobacco: Never Used   Substance Use Topics    Alcohol use: Not Currently    Drug use: Not on file        Review of Systems:  Negative      OBJECTIVE:     Vital Signs (Most Recent):  Temp: 98.1 °F (36.7 °C) (05/07/20 0725)  Pulse: 85 (05/07/20 0725)  Resp: 17 (05/07/20 0725)  BP: (!) 148/85 (05/07/20 0725)  SpO2: 99 % (05/07/20 0725)    Inpatient Medications:  Current Facility-Administered Medications   Medication Dose Route Frequency Provider Last Rate Last Dose    dexamethasone injection 4 mg  4 mg Intravenous Once August Aviles MD        diphenhydrAMINE injection 12.5 mg  12.5 mg Intravenous Q4H August Aviles MD        diphenhydrAMINE injection 6.25 mg  6.25 mg Intravenous Q15 Min PRN August Aviles MD        fentaNYL injection 25 mcg  25 mcg Intravenous Q3 Min PRN August Aviles MD        levoFLOXacin 500 mg/100 mL IVPB 500 mg  500 mg Intravenous Q24H Derian Aguillon MD        ondansetron disintegrating tablet 4 mg  4 mg Oral Once August Aviles MD        ondansetron injection 4 mg  4 mg Intravenous Daily PRN August Aviles MD        oxyCODONE immediate release tablet 5 mg  5 mg Oral Q3H PRN August Aviles MD        oxyCODONE immediate  release tablet 5 mg  5 mg Oral Q4H PRN August Aviles MD        scopolamine 1.3-1.5 mg (1 mg over 3 days) 1 patch  1 patch Transdermal Q3 Days August Aviles MD   1 patch at 05/07/20 1018    sodium chloride 0.9% flush 10 mL  10 mL Intravenous PRN August Aviles MD              ASSESSMENT/PLAN:     Left renal stone      Lithotripsy

## 2020-05-07 NOTE — DISCHARGE SUMMARY
Patient comes in for routine outpatient lithotripsy    Procedure is done w no complication    After a brief stay in recovery, patient is discharged in good condition    Meds given:  Lortab    F/u office:  1 week with lou MURPHY

## 2020-05-07 NOTE — OP NOTE
History  Chief Complaint   Patient presents with    Hip Pain     tripped over dog, Fell to floor ,landed on right hip area  Denies any head or neck pain  No Blood thinners  c/o pain right hip up tp right lumbar area  History provided by:  Patient   used: No     This 77-year-old female with history of bilateral hip replacement presented ED with complain of right hip pain and lumbar pain after she had a mechanical fall at home this morning  Patient states she was walking her dog when she tripped over dog and fell on the right side  Patient is ambulatory in the ED  She states she normally uses a walker for walking however she was not using a walker this morning could she felt okay walking without the walker  Patient rating her pain 8/10 at this time pain is mainly in the right hip and her lower back  Patient states her pain is nonradiating nothing makes the pain better or worse  She denies any numbness or tingling denies any difficulty with ambulation  Patient states she did not hit her head denies any loss of consciousness patient is not on any blood thinner  Patient is currently awake alert oriented x3 able to provide the history without any difficulty  Prior to Admission Medications   Prescriptions Last Dose Informant Patient Reported? Taking? ALPRAZolam (XANAX) 1 mg tablet  Self Yes Yes   Sig: Take 1 mg by mouth 4 (four) times a day  DULoxetine (CYMBALTA) 60 mg delayed release capsule Not Taking at Unknown time Self Yes No   Sig: Take 1 capsule by mouth   HYDROcodone-acetaminophen (NORCO) 5-325 mg per tablet  Self Yes Yes   Sig: Take 1 tablet by mouth as needed for moderate pain  Lurasidone HCl (LATUDA) 60 MG TABS   No No   Sig: Take 1 tablet (60 mg total) by mouth daily for 30 days   Melatonin 5 MG CAPS  Self Yes No   Sig: Take by mouth   QUEtiapine (SEROquel) 100 mg tablet  Self Yes Yes   Sig: Take 100 mg by mouth daily at bedtime     albuterol (PROVENTIL HFA,VENTOLIN Operative Note         SUMMARY     Surgery Date:  5/7/2020     Assistant:     Indications:  This 59-year-old male has a left renal stone  Here for lithotripsy    Pre-op Diagnosis:       Post-op Diagnosis:  Same    Procedure:  Left ESWL    Anesthesia: General    Description of Procedure:   After consents were obtained the patient was taken to the operating room  Placed in a supine position  Anesthesia is given  Dornier lithotripter is brought into the room  Patient is properly positioned on the lithotripsy probe  The stone is    4     Mm left renal  We began at a KV of 16 and gradually increased to 25       3000    Shocks were delivered      The procedure was done without any complication  After the procedure the patient is brought to the recovery room in satisfactory condition      Findings/Key Components:          Estimated Blood Loss:    None         HFA) 90 mcg/act inhaler   No Yes   Sig: Inhale 2 puffs every 6 (six) hours as needed for wheezing   carisoprodol (SOMA) 350 mg tablet  Self Yes Yes   Sig: Take 350 mg by mouth as needed for muscle spasms  Pt takes 2-3 daily as needed   docusate sodium (COLACE) 100 mg capsule  Self Yes Yes   Sig: Take by mouth   ergocalciferol (VITAMIN D2) 50,000 units  Self Yes Yes   Sig: Take by mouth   gabapentin (NEURONTIN) 800 mg tablet  Self Yes Yes   Sig: Take 800 mg by mouth 4 (four) times a day as needed  ketotifen (ZADITOR) 0 025 % ophthalmic solution  Self Yes Yes   Sig: Apply to eye   metoprolol succinate (TOPROL-XL) 100 mg 24 hr tablet  Self Yes Yes   Sig: Take 100 mg by mouth    pantoprazole (PROTONIX) 40 mg tablet  Self Yes Yes   Sig: Take 40 mg by mouth 2 (two) times a day  rifampin (RIFADIN) 150 mg capsule  Self Yes Yes   Sig: Take by mouth   topiramate (TOPAMAX) 100 mg tablet  Self No No   Sig: Take 1 tablet (100 mg total) by mouth daily   Patient not taking: Reported on 11/25/2018       Facility-Administered Medications: None       Past Medical History:   Diagnosis Date    Bipolar 1 disorder ([facility] [address] )     Cancer ([facility] [address] )     kidney    Cardiac disease     fast heart beat    Chronic pain     GERD (gastroesophageal reflux disease)     Hard of hearing     Hypertension     Renal cell carcinoma (HCC)     TIA (transient ischemic attack)        Past Surgical History:   Procedure Laterality Date    APPENDECTOMY      CHOLECYSTECTOMY      JOINT REPLACEMENT       bilateral    NEPHRECTOMY Right     DC COLONOSCOPY FLX DX W/COLLJ SPEC WHEN PFRMD N/A 8/7/2018    Procedure: COLONOSCOPY;  Surgeon: Michelle Worthy MD;  Location: [facility];  Service: Gastroenterology       History reviewed  No pertinent family history  I have reviewed and agree with the history as documented      Social History   Substance Use Topics    Smoking status: Former Smoker    Smokeless tobacco: Never Used    Alcohol use No        Review of Systems   Constitutional: Negative  HENT: Negative  Respiratory: Negative  Cardiovascular: Negative  Gastrointestinal: Negative  Musculoskeletal: Positive for back pain  Right hip pain    Neurological: Negative  Psychiatric/Behavioral: Negative  Physical Exam  Physical Exam   Constitutional: She is oriented to person, place, and time  She appears well-developed and well-nourished  HENT:   Head: Normocephalic and atraumatic  Eyes: Pupils are equal, round, and reactive to light  EOM are normal    Neck: Normal range of motion  Neck supple  Cardiovascular: Normal rate, regular rhythm and normal heart sounds  Pulmonary/Chest: Effort normal and breath sounds normal    Abdominal: Soft  Bowel sounds are normal    Musculoskeletal:   B/L Hip Exam:  Range of motion intact  External and internal rotation at the hip joint is intact no pain was illicit  Lower back  There is mild tenderness to palpation at the right lumbar region  No other deformities noted  Neurological: She is alert and oriented to person, place, and time  Nursing note and vitals reviewed        Vital Signs  ED Triage Vitals   Temperature Pulse Respirations Blood Pressure SpO2   12/24/18 0723 12/24/18 0723 12/24/18 0830 12/24/18 0723 12/24/18 0723   (!) 97 4 °F (36 3 °C) 75 18 159/78 98 %      Temp Source Heart Rate Source Patient Position - Orthostatic VS BP Location FiO2 (%)   12/24/18 0723 12/24/18 0723 12/24/18 0723 12/24/18 0723 --   Temporal Monitor Sitting Left arm       Pain Score       12/24/18 0723       8           Vitals:    12/24/18 0723 12/24/18 0830   BP: 159/78 (!) 171/122   Pulse: 75 70   Patient Position - Orthostatic VS: Sitting Lying       Visual Acuity      ED Medications  Medications   acetaminophen (TYLENOL) tablet 650 mg (650 mg Oral Given 12/24/18 0751)   ketorolac (TORADOL) injection 60 mg (60 mg Intramuscular Given 12/24/18 0900)   dexamethasone (PF) (DECADRON) injection 10 mg (10 mg Intramuscular Given 12/24/18 0900)       Diagnostic Studies  Results Reviewed     None                 XR spine lumbar 2 or 3 views injury   Final Result by Janna Welsh MD (12/24 8333)      Stable chronic disc degeneration L3-L4 and L4-L5  No fracture or malalignment seen         Workstation performed: [id number]         XR hips bilateral 2 vw w pelvis if performed   Final Result by Janna Welsh MD (12/24 2455)      No acute findings  No change from prior            Workstation performed: [id number]                    Procedures  Procedures       Phone Contacts  ED Phone Contact    ED Course        patient states he is feeling much better and she is ambulatory in the ED walk to her car  Patient is discharged home  [facility]  CritCare Time    Disposition  Final diagnoses:   Fall, initial encounter   Hip pain, acute, right     Time reflects when diagnosis was documented in both MDM as applicable and the Disposition within this note     Time User Action Codes Description Comment    12/24/2018  8:46 AM Alec Duenas Add [A05  XXXA] Fall, initial encounter     12/24/2018  8:46 AM Alec Duenas Add [M25 551] Hip pain, acute, right       ED Disposition     None      Follow-up Information     Follow up With Specialties Details Why Contact Info    Ramu Seymour PA-C Family Medicine, Physician Assistant In 2 days If symptoms worsen [address] [place] [address]  [address] [phone]            Patient's Medications   Discharge Prescriptions    ACETAMINOPHEN-CODEINE (TYLENOL #3) 300-30 MG PER TABLET    Take 1-2 tablets by mouth every 6 (six) hours as needed for moderate pain for up to 10 days       Start Date: 12/24/2018End Date: 1/3/2019       Order Dose: 1-2 tablets       Quantity: 7 tablet    Refills: 0     No discharge procedures on file      ED Provider  Electronically Signed by           Devendra Woody MD  12/24/18 0868

## 2020-05-07 NOTE — ANESTHESIA PREPROCEDURE EVALUATION
05/07/2020  Enrique Yousif is a 59 y.o., male.    There is no problem list on file for this patient.      Past Surgical History:   Procedure Laterality Date    APPENDECTOMY  23yrs old    CYSTOSCOPY WITH CALCULUS EXTRACTION Left 4/23/2020    Procedure: CYSTOSCOPY, WITH CALCULUS REMOVAL;  Surgeon: Derian Acosta MD;  Location: [facility];  Service: Urology;  Laterality: Left;    Right Knee Orthoscopic Surgery  2014    Right Shoulder Rotator Cuff  2014    TONSILLECTOMY  Age 33yrs old    URETEROSCOPY N/A 4/23/2020    Procedure: URETEROSCOPY;  Surgeon: Derian Acosta MD;  Location: [facility];  Service: Urology;  Laterality: N/A;        Tobacco Use:  The patient  reports that he has never smoked. He has never used smokeless tobacco.     Results for orders placed or performed during the hospital encounter of 04/22/20   EKG 12-lead    Collection Time: 04/22/20  3:42 PM    Narrative    Test Reason : Z01.818,    Vent. Rate : 100 BPM     Atrial Rate : 100 BPM     P-R Int : 192 ms          QRS Dur : 128 ms      QT Int : 372 ms       P-R-T Axes : 080 072 063 degrees     QTc Int : 479 ms    Normal sinus rhythm  Right bundle branch block  Abnormal ECG  No previous ECGs available  Confirmed by Osvaldo Espinal MD ([id number]) on 4/22/2020 6:20:36 PM    Referred By: DERIAN ACOSTA           Confirmed By:Osvaldo Espinal MD             Lab Results   Component Value Date    WBC 8.82 04/22/2020    HGB 13.6 (L) 04/22/2020    HCT 40.9 04/22/2020    MCV 91 04/22/2020     04/22/2020     BMP  Lab Results   Component Value Date     04/22/2020    K 3.8 04/22/2020     04/22/2020    CO2 31 (H) 04/22/2020    BUN 20 04/22/2020    CREATININE 1.2 04/22/2020    CALCIUM 9.6 04/22/2020    ANIONGAP 7 (L) 04/22/2020    ESTGFRAFRICA >60.0 04/22/2020    EGFRNONAA >60.0 04/22/2020             Anesthesia  Evaluation    I have reviewed the Patient Summary Reports.    I have reviewed the Nursing Notes.   I have reviewed the Medications.     Review of Systems  Anesthesia Hx:  No problems with previous Anesthesia  History of prior surgery of interest to airway management or planning: Previous anesthesia: General Denies Family Hx of Anesthesia complications.   Denies Personal Hx of Anesthesia complications.   Social:  Non-Smoker, No Alcohol Use    Hematology/Oncology:  Hematology Normal   Oncology Normal     EENT/Dental:EENT/Dental Normal   Cardiovascular:  Cardiovascular Normal  ECG has been reviewed.    Pulmonary:  Pulmonary Normal    Renal/:   Chronic Renal Disease renal calculi    Hepatic/GI:  Hepatic/GI Normal    Musculoskeletal:  Musculoskeletal Normal    Neurological:  Neurology Normal    Endocrine:  Endocrine Normal    Dermatological:  Skin Normal    Psych:  Psychiatric Normal           Physical Exam  General:  Well nourished    Airway/Jaw/Neck:  Airway Findings: Mouth Opening: Normal Tongue: Normal  General Airway Assessment: Adult  Mallampati: III  Improves to III with phonation.  TM Distance: Normal, at least 6 cm  Jaw/Neck Findings:  Neck ROM: Normal ROM       Chest/Lungs:  Chest/Lungs Findings: Clear to auscultation, Normal Respiratory Rate     Heart/Vascular:  Heart Findings: Rate: Normal  Rhythm: Regular Rhythm  Sounds: Normal     Abdomen:  Abdomen Findings: Normal    Musculoskeletal:  Musculoskeletal Findings: Normal   Skin:  Skin Findings: Normal    Mental Status:  Mental Status Findings:  Cooperative, Alert and Oriented         Anesthesia Plan  Type of Anesthesia, risks & benefits discussed:  Anesthesia Type:  general  Patient's Preference: General  Intra-op Monitoring Plan: standard ASA monitors  Intra-op Monitoring Plan Comments:   Post Op Pain Control Plan: multimodal analgesia  Post Op Pain Control Plan Comments:   Induction:   IV  Beta Blocker:  Patient is not currently on a Beta-Blocker (No  further documentation required).       Informed Consent: Patient understands risks and agrees with Anesthesia plan.  Questions answered. Anesthesia consent signed with patient.  ASA Score: 2     Day of Surgery Review of History & Physical: I have interviewed and examined the patient. I have reviewed the patient's H&P dated:  There are no significant changes.      Anesthesia Plan Notes: GETA or LMA  Ofirmev intra op  Zofran        Ready For Surgery From Anesthesia Perspective.

## 2020-05-07 NOTE — TRANSFER OF CARE
"Anesthesia Transfer of Care Note    Patient: Enrique Yousif    Procedure(s) Performed: Procedure(s) (LRB):  LITHOTRIPSY, ESWL (Left)    Patient location: PACU    Anesthesia Type: general    Transport from OR: Transported from OR on room air with adequate spontaneous ventilation    Post pain: adequate analgesia    Post assessment: no apparent anesthetic complications    Post vital signs: stable    Level of consciousness: awake and alert    Nausea/Vomiting: no nausea/vomiting    Complications: none    Transfer of care protocol was followed      Last vitals:   Visit Vitals  BP (!) 148/85 (BP Location: Left arm, Patient Position: Lying)   Pulse 85   Temp 36.7 °C (98.1 °F)   Resp 17   Ht 5' 10" (1.778 m)   Wt 88.5 kg (195 lb)   SpO2 99%   BMI 27.98 kg/m²     "

## 2020-05-07 NOTE — ANESTHESIA POSTPROCEDURE EVALUATION
Anesthesia Post Evaluation    Patient: Enrique Yousif    Procedure(s) Performed: Procedure(s) (LRB):  LITHOTRIPSY, ESWL (Left)    Final Anesthesia Type: general    Patient location during evaluation: PACU  Patient participation: Yes- Able to Participate  Level of consciousness: awake and alert, oriented and awake  Post-procedure vital signs: reviewed and stable  Pain management: adequate  Airway patency: patent    PONV status at discharge: No PONV  Anesthetic complications: no      Cardiovascular status: blood pressure returned to baseline, hemodynamically stable and stable  Respiratory status: unassisted, spontaneous ventilation and room air  Hydration status: euvolemic  Follow-up not needed.          Vitals Value Taken Time   /74 5/7/2020  1:30 PM   Temp 36.3 °C (97.4 °F) 5/7/2020  1:30 PM   Pulse 75 5/7/2020  1:30 PM   Resp 16 5/7/2020  1:30 PM   SpO2 99 % 5/7/2020  1:00 PM         Event Time     Out of Recovery 12:42:13          Pain/Valeria Score: Valeria Score: 10 (5/7/2020  1:30 PM)

## 2020-05-07 NOTE — DISCHARGE INSTRUCTIONS
Anesthesia: Before You Receive Anesthesia  You are scheduled for surgery. Youll receive medicine called anesthesia to keep you from feeling pain during the surgery. This sheet explains steps you may need to take to prepare for anesthesia.    Shock Wave Lithotripsy  Passing a kidney stone can be very painful. Shock wave lithotripsy is a treatment that helps by breaking the kidney stone into smaller pieces that are easier to pass. This treatment is also called extracorporeal shock wave lithotripsy (ESWL). Lithotripsy takes about an hour. Its done in a hospital, lithotripsy center, or mobile lithotripsy van. You will likely go home the same day. This treatment is not used for all types of kidney stones. Your healthcare provider will discuss whether this is the right treatment for the type of stone you have.      Energy waves strike the stone, which begins to crack. The stone crumbles into tiny pieces.   During the procedure  · You get medicine to prevent pain and help you relax or sleep during lithotripsy. Once this takes effect, the procedure will start.  · A flexible tube (stent) with holes in it may be placed into your ureter, the tube that connects the kidney and the bladder. This helps keep urine flowing from the kidney.  · Your healthcare provider then uses X-ray or ultrasound to find the exact location of the kidney stone.  · Sound waves are aimed at the stone and sent at high speed. If youre awake, you may feel a tapping as they pass through your body.  After the procedure  · Youll be closely watched in a recovery room for about 1 to 3 hours. Antibiotics and pain medicine may be prescribed before you leave.  · Youll have a follow-up visit in a few weeks. If you received a stent, it will be removed. Your healthcare provider will also check for pieces of stone. If large pieces remain, you may need a second lithotripsy or another procedure.  Possible risks and complications  · Infection  · Bleeding in the  kidney  · Bruising of the kidney or skin  · Blockage (obstruction) of the ureter  · Failure to break up the stone (other procedures may be needed)   Passing the stone  It can take a day to several weeks for the pieces of stone to leave your body. Drink plenty of liquids to help flush your system. During this time:  · Your urine may be cloudy or slightly bloody. You may even see small pieces of stone.  · You may have a slight fever and some pain. Take prescribed or over-the-counter pain medicine as instructed by your healthcare provider.  · You may be asked to strain your urine to collect some stone particles. These will be studied in the lab.  When to call your healthcare provider   Call your healthcare provider right away if you have any of the following:  · Fever of 100.4°F (38°C) or higher, or as directed by your healthcare provider  · Heavy bleeding  · Pain that doesnt go away with medicine  · Upset stomach and vomiting  · Problems urinating   Date Last Reviewed: 1/1/2017 © 2000-2017 [employer]. [address] [place] [address]. All rights reserved. This information is not intended as a substitute for professional medical care. Always follow your healthcare professional's instructions.        Tests  Your healthcare provider may send you to have certain tests before your procedure. These may include:  · Blood tests. These help show how anesthesia may affect you.  · Electrocardiography (ECG or EKG). This helps show how your heart is working.  · Chest X-ray. This image helps show the health of your heart and lungs.  Medicines  In the weeks before your surgery:  · Tell your healthcare provider and anesthesia provider what medicines you take. This includes aspirin, other over-the-counter medicines, herbs, and vitamins. Be sure to mention if you take illegal drugs. (This will be kept confidential.) Giving this information helps to keep you safe.  · You may be told to change certain medicines  you take. Or you may be told to stop taking medicines for a certain amount of time.  · Mention how much alcohol you drink and if you smoke. Also mention whether youre allergic to any medicines.  Other preparations  · Follow any directions you are given for not eating or drinking before surgery.  · If you dont talk to your anesthesia provider before surgery, you will meet the day of the procedure. He or she will explain your anesthesia and answer your questions.  · Arrange for an adult family member or friend to drive you home after the surgery.  Be sure to follow all your healthcare providers instructions. If you dont, your procedure may have to be rescheduled.   Date Last Reviewed: 12/1/2016  © 3558-2594 [employer]. [address] [place] [address], [place] [address]. All rights reserved. This information is not intended as a substitute for professional medical care. Always follow your healthcare professional's instructions.

## 2020-05-08 VITALS
TEMPERATURE: 97 F | OXYGEN SATURATION: 99 % | HEIGHT: 70 IN | BODY MASS INDEX: 27.92 KG/M2 | DIASTOLIC BLOOD PRESSURE: 74 MMHG | SYSTOLIC BLOOD PRESSURE: 131 MMHG | RESPIRATION RATE: 16 BRPM | WEIGHT: 195 LBS | HEART RATE: 75 BPM

## 2020-05-15 DIAGNOSIS — N20.0 URIC ACID NEPHROLITHIASIS: Primary | ICD-10-CM

## 2020-05-18 ENCOUNTER — HOSPITAL ENCOUNTER (OUTPATIENT)
Dept: RADIOLOGY | Facility: HOSPITAL | Age: 60
Discharge: HOME | End: 2020-05-18
Attending: SPECIALIST
Payer: COMMERCIAL

## 2020-05-18 DIAGNOSIS — N20.0 URIC ACID NEPHROLITHIASIS: ICD-10-CM

## 2020-05-18 PROCEDURE — 74018 RADEX ABDOMEN 1 VIEW: CPT | Mod: TC,PO

## 2020-06-02 ENCOUNTER — LAB VISIT (OUTPATIENT)
Dept: LAB | Facility: HOSPITAL | Age: 60
End: 2020-06-02
Attending: SPECIALIST
Payer: COMMERCIAL

## 2020-06-02 ENCOUNTER — HOSPITAL ENCOUNTER (OUTPATIENT)
Dept: PREADMISSION TESTING | Facility: HOSPITAL | Age: 60
Discharge: HOME | End: 2020-06-02
Attending: SPECIALIST
Payer: COMMERCIAL

## 2020-06-02 ENCOUNTER — HOSPITAL ENCOUNTER (OUTPATIENT)
Dept: RADIOLOGY | Facility: HOSPITAL | Age: 60
Discharge: HOME | End: 2020-06-02
Attending: SPECIALIST
Payer: COMMERCIAL

## 2020-06-02 VITALS
OXYGEN SATURATION: 97 % | WEIGHT: 190 LBS | DIASTOLIC BLOOD PRESSURE: 85 MMHG | HEIGHT: 70 IN | BODY MASS INDEX: 27.2 KG/M2 | RESPIRATION RATE: 18 BRPM | TEMPERATURE: 98 F | SYSTOLIC BLOOD PRESSURE: 138 MMHG | HEART RATE: 97 BPM

## 2020-06-02 DIAGNOSIS — N20.1 LEFT URETERAL STONE: Primary | ICD-10-CM

## 2020-06-02 DIAGNOSIS — Z01.818 PRE-OP TESTING: Primary | ICD-10-CM

## 2020-06-02 DIAGNOSIS — N20.1 CALCULUS OF URETER: ICD-10-CM

## 2020-06-02 DIAGNOSIS — N20.1 CALCULUS OF URETER: Primary | ICD-10-CM

## 2020-06-02 DIAGNOSIS — Z01.818 PREOP TESTING: Primary | ICD-10-CM

## 2020-06-02 LAB
ALBUMIN SERPL BCP-MCNC: 4.5 G/DL (ref 3.5–5.2)
ALP SERPL-CCNC: 57 U/L (ref 55–135)
ALT SERPL W/O P-5'-P-CCNC: 23 U/L (ref 10–44)
ANION GAP SERPL CALC-SCNC: 9 MMOL/L (ref 8–16)
APTT PPP: 30.3 SEC (ref 23.6–33.3)
AST SERPL-CCNC: 20 U/L (ref 10–40)
BACTERIA #/AREA URNS HPF: NEGATIVE /HPF
BILIRUB SERPL-MCNC: 0.7 MG/DL (ref 0.1–1)
BILIRUB UR QL STRIP: NEGATIVE
BUN SERPL-MCNC: 18 MG/DL (ref 8–23)
CALCIUM SERPL-MCNC: 9.2 MG/DL (ref 8.7–10.5)
CHLORIDE SERPL-SCNC: 105 MMOL/L (ref 95–110)
CLARITY UR: CLEAR
CO2 SERPL-SCNC: 28 MMOL/L (ref 23–29)
COLOR UR: YELLOW
CREAT SERPL-MCNC: 1 MG/DL (ref 0.5–1.4)
ERYTHROCYTE [DISTWIDTH] IN BLOOD BY AUTOMATED COUNT: 13.3 % (ref 11.5–14.5)
EST. GFR  (AFRICAN AMERICAN): >60 ML/MIN/1.73 M^2
EST. GFR  (NON AFRICAN AMERICAN): >60 ML/MIN/1.73 M^2
GLUCOSE SERPL-MCNC: 137 MG/DL (ref 70–110)
GLUCOSE UR QL STRIP: NEGATIVE
HCT VFR BLD AUTO: 40 % (ref 40–54)
HGB BLD-MCNC: 13.4 G/DL (ref 14–18)
HGB UR QL STRIP: ABNORMAL
HYALINE CASTS #/AREA URNS LPF: 10 /LPF
INR PPP: 1
KETONES UR QL STRIP: NEGATIVE
LEUKOCYTE ESTERASE UR QL STRIP: ABNORMAL
MCH RBC QN AUTO: 30.3 PG (ref 27–31)
MCHC RBC AUTO-ENTMCNC: 33.5 G/DL (ref 32–36)
MCV RBC AUTO: 91 FL (ref 82–98)
MICROSCOPIC COMMENT: ABNORMAL
NITRITE UR QL STRIP: NEGATIVE
PH UR STRIP: 6 [PH] (ref 5–8)
PLATELET # BLD AUTO: 191 K/UL (ref 150–350)
PMV BLD AUTO: 9.4 FL (ref 9.2–12.9)
POTASSIUM SERPL-SCNC: 4.2 MMOL/L (ref 3.5–5.1)
PROT SERPL-MCNC: 7.2 G/DL (ref 6–8.4)
PROT UR QL STRIP: ABNORMAL
PROTHROMBIN TIME: 12.9 SEC (ref 10.6–14.8)
RBC # BLD AUTO: 4.42 M/UL (ref 4.6–6.2)
RBC #/AREA URNS HPF: 10 /HPF (ref 0–4)
SODIUM SERPL-SCNC: 142 MMOL/L (ref 136–145)
SP GR UR STRIP: 1.02 (ref 1–1.03)
SQUAMOUS #/AREA URNS HPF: 2 /HPF
URN SPEC COLLECT METH UR: ABNORMAL
UROBILINOGEN UR STRIP-ACNC: NEGATIVE EU/DL
WBC # BLD AUTO: 6.33 K/UL (ref 3.9–12.7)
WBC #/AREA URNS HPF: 11 /HPF (ref 0–5)

## 2020-06-02 PROCEDURE — 85730 THROMBOPLASTIN TIME PARTIAL: CPT

## 2020-06-02 PROCEDURE — 36415 COLL VENOUS BLD VENIPUNCTURE: CPT

## 2020-06-02 PROCEDURE — 71046 X-RAY EXAM CHEST 2 VIEWS: CPT | Mod: TC

## 2020-06-02 PROCEDURE — 81001 URINALYSIS AUTO W/SCOPE: CPT

## 2020-06-02 PROCEDURE — U0003 INFECTIOUS AGENT DETECTION BY NUCLEIC ACID (DNA OR RNA); SEVERE ACUTE RESPIRATORY SYNDROME CORONAVIRUS 2 (SARS-COV-2) (CORONAVIRUS DISEASE [COVID-19]), AMPLIFIED PROBE TECHNIQUE, MAKING USE OF HIGH THROUGHPUT TECHNOLOGIES AS DESCRIBED BY CMS-2020-01-R: HCPCS

## 2020-06-02 PROCEDURE — 85027 COMPLETE CBC AUTOMATED: CPT

## 2020-06-02 PROCEDURE — 85610 PROTHROMBIN TIME: CPT

## 2020-06-02 PROCEDURE — 80053 COMPREHEN METABOLIC PANEL: CPT

## 2020-06-02 NOTE — DISCHARGE INSTRUCTIONS
To confirm, Your doctor has instructed you that surgery is scheduled for: 6/4/20    Pre-Op will call the afternoon prior to surgery between 4:00 and 6:00 PM with the final arrival time.      Enter at [facility] and come through front entrance.  You will be screened/ have temperature checked.    You may have 1 visitor who will also be screened.     Check in at registration.   After registration, proceed past gift shop and through glass door ( [facility]) Check in at the nurses station to the left.   Do not eat or drink anything after midnight the night before your surgery - THIS INCLUDES  WATER, GUM, MINTS AND CANDY.  YOU MAY BRUSH YOUR TEETH BUT DO NOT SWALLOW     TAKE ONLY THESE MEDICATIONS WITH A SMALL SIP OF WATER THE MORNING OF YOUR PROCEDURE:      PLEASE NOTE:  The surgery schedule has many variables which may affect the time of your surgery case.  Family members should be available if your surgery time changes.  Plan to be here the day of your procedure between 4-6 hours.      DO NOT TAKE THESE MEDICATIONS 5-7 DAYS PRIOR to your procedure or per your surgeon's request: ASPIRIN, ALEVE, ADVIL, IBUPROFEN,  ROSHAN SELTZER, BC , FISH OIL , VITAMIN E, HERBALS  (May take Tylenol)      ONLY if you are prescribed any types of blood thinners such as:  Aspirin, Coumadin, Plavix, Pradaxa, Xarelto, Aggrenox, Effient, Eliquis, Savasya, Brilinta, or any other, ask your surgeon whether you should stop taking them and how long before surgery you should stop.  You may also need to verify with the prescribing physician if it is ok to stop your medication.                                                        IMPORTANT INSTRUCTIONS      · Do not smoke, vape or drink alcoholic beverages 24 hours prior to your procedure.  · Shower the night before AND the morning of your procedure with a Chlorhexidine wash such as Hibiclens or Dial antibacterial soap from the neck down.   ·  Do not get it on your face or in your eyes.   You may use your own shampoo and face wash. This helps your skin to be as bacteria free as possible.   ·  DO NOT remove hair from the surgery site.  Do not shave the incision site unless you are given specific instructions to do so.    · Sleep in a bed with clean sheets.  Do not sleep with a pet in the bed.   · If you wear contact lenses, dentures, hearing aids or glasses, bring a container to put them in during surgery and give to a family member for safe keeping.    · Please leave all jewelry, piercing's and valuables at home.   · Wear rubber soled shoes (no flip flops).    · Make arrangements in advance for transportation home by a responsible adult.      · You must make arrangements for transportation, TAXI'S, UBER'S OR LYFTS ARE NOT ALLOWED.        If you have any questions about these instructions, call (Monday - Friday) Pre-Op Admit  Nursing  at [phone] or the Pre-Op Day Surgery Unit at [phone].

## 2020-06-03 LAB — SARS-COV-2 RNA RESP QL NAA+PROBE: NOT DETECTED

## 2020-06-04 ENCOUNTER — ANESTHESIA EVENT (OUTPATIENT)
Dept: SURGERY | Facility: HOSPITAL | Age: 60
End: 2020-06-04
Payer: COMMERCIAL

## 2020-06-04 ENCOUNTER — HOSPITAL ENCOUNTER (OUTPATIENT)
Facility: HOSPITAL | Age: 60
Discharge: HOME | End: 2020-06-04
Attending: SPECIALIST | Admitting: SPECIALIST
Payer: COMMERCIAL

## 2020-06-04 ENCOUNTER — HOSPITAL ENCOUNTER (OUTPATIENT)
Dept: RADIOLOGY | Facility: HOSPITAL | Age: 60
Discharge: HOME | End: 2020-06-04
Attending: SPECIALIST | Admitting: SPECIALIST
Payer: COMMERCIAL

## 2020-06-04 ENCOUNTER — ANESTHESIA (OUTPATIENT)
Dept: SURGERY | Facility: HOSPITAL | Age: 60
End: 2020-06-04
Payer: COMMERCIAL

## 2020-06-04 VITALS
OXYGEN SATURATION: 99 % | BODY MASS INDEX: 27.2 KG/M2 | HEART RATE: 72 BPM | DIASTOLIC BLOOD PRESSURE: 74 MMHG | HEIGHT: 70 IN | SYSTOLIC BLOOD PRESSURE: 127 MMHG | WEIGHT: 190 LBS | TEMPERATURE: 98 F | RESPIRATION RATE: 17 BRPM

## 2020-06-04 DIAGNOSIS — Z41.9 SURGERY, ELECTIVE: ICD-10-CM

## 2020-06-04 DIAGNOSIS — N20.0 RENAL STONE: Primary | ICD-10-CM

## 2020-06-04 DIAGNOSIS — N20.0 KIDNEY STONE: ICD-10-CM

## 2020-06-04 DIAGNOSIS — N20.1 LEFT URETERAL STONE: ICD-10-CM

## 2020-06-04 PROCEDURE — 71000033 HC RECOVERY, INTIAL HOUR: Performed by: SPECIALIST

## 2020-06-04 PROCEDURE — 37000009 HC ANESTHESIA EA ADD 15 MINS: Performed by: SPECIALIST

## 2020-06-04 PROCEDURE — 27000671 HC TUBING MICROBORE EXT: Performed by: STUDENT IN AN ORGANIZED HEALTH CARE EDUCATION/TRAINING PROGRAM

## 2020-06-04 PROCEDURE — 25000003 PHARM REV CODE 250: Performed by: ANESTHESIOLOGY

## 2020-06-04 PROCEDURE — 27202105 HC BIS BILATERAL SENSOR: Performed by: STUDENT IN AN ORGANIZED HEALTH CARE EDUCATION/TRAINING PROGRAM

## 2020-06-04 PROCEDURE — 27000653 HC CATH, IV CATHLN: Performed by: STUDENT IN AN ORGANIZED HEALTH CARE EDUCATION/TRAINING PROGRAM

## 2020-06-04 PROCEDURE — 63600175 PHARM REV CODE 636 W HCPCS: Performed by: NURSE ANESTHETIST, CERTIFIED REGISTERED

## 2020-06-04 PROCEDURE — 36000707: Performed by: SPECIALIST

## 2020-06-04 PROCEDURE — 25000003 PHARM REV CODE 250: Performed by: NURSE ANESTHETIST, CERTIFIED REGISTERED

## 2020-06-04 PROCEDURE — 36000706: Performed by: SPECIALIST

## 2020-06-04 PROCEDURE — 27201423 OPTIME MED/SURG SUP & DEVICES STERILE SUPPLY: Performed by: SPECIALIST

## 2020-06-04 PROCEDURE — 27000673 HC TUBING BLOOD Y: Performed by: STUDENT IN AN ORGANIZED HEALTH CARE EDUCATION/TRAINING PROGRAM

## 2020-06-04 PROCEDURE — 74018 RADEX ABDOMEN 1 VIEW: CPT | Mod: TC

## 2020-06-04 PROCEDURE — 37000008 HC ANESTHESIA 1ST 15 MINUTES: Performed by: SPECIALIST

## 2020-06-04 PROCEDURE — 27202103: Performed by: STUDENT IN AN ORGANIZED HEALTH CARE EDUCATION/TRAINING PROGRAM

## 2020-06-04 PROCEDURE — 71000015 HC POSTOP RECOV 1ST HR: Performed by: SPECIALIST

## 2020-06-04 PROCEDURE — 71000016 HC POSTOP RECOV ADDL HR: Performed by: SPECIALIST

## 2020-06-04 RX ORDER — ONDANSETRON 2 MG/ML
INJECTION INTRAMUSCULAR; INTRAVENOUS
Status: DISCONTINUED | OUTPATIENT
Start: 2020-06-04 | End: 2020-06-04

## 2020-06-04 RX ORDER — ACETAMINOPHEN 500 MG
1000 TABLET ORAL ONCE
Status: COMPLETED | OUTPATIENT
Start: 2020-06-04 | End: 2020-06-04

## 2020-06-04 RX ORDER — HYDROCODONE BITARTRATE AND ACETAMINOPHEN 5; 325 MG/1; MG/1
1 TABLET ORAL EVERY 4 HOURS PRN
Qty: 12 TABLET | Refills: 0 | Status: ON HOLD
Start: 2020-06-04 | End: 2020-11-16 | Stop reason: HOSPADM

## 2020-06-04 RX ORDER — GABAPENTIN 300 MG/1
300 CAPSULE ORAL ONCE
Status: COMPLETED | OUTPATIENT
Start: 2020-06-04 | End: 2020-06-04

## 2020-06-04 RX ORDER — FENTANYL CITRATE 50 UG/ML
INJECTION, SOLUTION INTRAMUSCULAR; INTRAVENOUS
Status: DISCONTINUED | OUTPATIENT
Start: 2020-06-04 | End: 2020-06-04

## 2020-06-04 RX ORDER — HYDROCODONE BITARTRATE AND ACETAMINOPHEN 5; 325 MG/1; MG/1
1 TABLET ORAL EVERY 4 HOURS PRN
Status: DISCONTINUED | OUTPATIENT
Start: 2020-06-04 | End: 2020-06-04 | Stop reason: HOSPADM

## 2020-06-04 RX ORDER — ONDANSETRON 2 MG/ML
4 INJECTION INTRAMUSCULAR; INTRAVENOUS DAILY PRN
Status: DISCONTINUED | OUTPATIENT
Start: 2020-06-04 | End: 2020-06-04 | Stop reason: HOSPADM

## 2020-06-04 RX ORDER — HYDROMORPHONE HYDROCHLORIDE 1 MG/ML
0.2 INJECTION, SOLUTION INTRAMUSCULAR; INTRAVENOUS; SUBCUTANEOUS
Status: DISCONTINUED | OUTPATIENT
Start: 2020-06-04 | End: 2020-06-04 | Stop reason: HOSPADM

## 2020-06-04 RX ORDER — MEPERIDINE HYDROCHLORIDE 50 MG/ML
12.5 INJECTION INTRAMUSCULAR; INTRAVENOUS; SUBCUTANEOUS EVERY 10 MIN PRN
Status: DISCONTINUED | OUTPATIENT
Start: 2020-06-04 | End: 2020-06-04 | Stop reason: HOSPADM

## 2020-06-04 RX ORDER — DIPHENHYDRAMINE HYDROCHLORIDE 50 MG/ML
12.5 INJECTION INTRAMUSCULAR; INTRAVENOUS
Status: DISCONTINUED | OUTPATIENT
Start: 2020-06-04 | End: 2020-06-04 | Stop reason: HOSPADM

## 2020-06-04 RX ORDER — SODIUM CHLORIDE, SODIUM LACTATE, POTASSIUM CHLORIDE, CALCIUM CHLORIDE 600; 310; 30; 20 MG/100ML; MG/100ML; MG/100ML; MG/100ML
INJECTION, SOLUTION INTRAVENOUS CONTINUOUS PRN
Status: DISCONTINUED | OUTPATIENT
Start: 2020-06-04 | End: 2020-06-04

## 2020-06-04 RX ORDER — MIDAZOLAM HYDROCHLORIDE 1 MG/ML
INJECTION, SOLUTION INTRAMUSCULAR; INTRAVENOUS
Status: DISCONTINUED | OUTPATIENT
Start: 2020-06-04 | End: 2020-06-04

## 2020-06-04 RX ORDER — OXYCODONE HYDROCHLORIDE 5 MG/1
5 TABLET ORAL
Status: DISCONTINUED | OUTPATIENT
Start: 2020-06-04 | End: 2020-06-04 | Stop reason: HOSPADM

## 2020-06-04 RX ORDER — LEVOFLOXACIN 5 MG/ML
500 INJECTION, SOLUTION INTRAVENOUS
Status: DISCONTINUED | OUTPATIENT
Start: 2020-06-04 | End: 2020-06-04 | Stop reason: HOSPADM

## 2020-06-04 RX ORDER — PROPOFOL 10 MG/ML
INJECTION, EMULSION INTRAVENOUS
Status: DISCONTINUED | OUTPATIENT
Start: 2020-06-04 | End: 2020-06-04

## 2020-06-04 RX ORDER — SODIUM CHLORIDE 0.9 % (FLUSH) 0.9 %
10 SYRINGE (ML) INJECTION
Status: DISCONTINUED | OUTPATIENT
Start: 2020-06-04 | End: 2020-06-04 | Stop reason: HOSPADM

## 2020-06-04 RX ORDER — LIDOCAINE HCL/PF 100 MG/5ML
SYRINGE (ML) INTRAVENOUS
Status: DISCONTINUED | OUTPATIENT
Start: 2020-06-04 | End: 2020-06-04

## 2020-06-04 RX ORDER — CEFAZOLIN SODIUM 1 G/3ML
INJECTION, POWDER, FOR SOLUTION INTRAMUSCULAR; INTRAVENOUS
Status: DISCONTINUED | OUTPATIENT
Start: 2020-06-04 | End: 2020-06-04

## 2020-06-04 RX ORDER — DEXAMETHASONE SODIUM PHOSPHATE 4 MG/ML
INJECTION, SOLUTION INTRA-ARTICULAR; INTRALESIONAL; INTRAMUSCULAR; INTRAVENOUS; SOFT TISSUE
Status: DISCONTINUED | OUTPATIENT
Start: 2020-06-04 | End: 2020-06-04

## 2020-06-04 RX ADMIN — LIDOCAINE HYDROCHLORIDE 75 MG: 20 INJECTION, SOLUTION INTRAVENOUS at 09:06

## 2020-06-04 RX ADMIN — ONDANSETRON 4 MG: 2 INJECTION INTRAMUSCULAR; INTRAVENOUS at 09:06

## 2020-06-04 RX ADMIN — MIDAZOLAM 2 MG: 1 INJECTION INTRAMUSCULAR; INTRAVENOUS at 09:06

## 2020-06-04 RX ADMIN — ACETAMINOPHEN 1000 MG: 500 TABLET, FILM COATED ORAL at 08:06

## 2020-06-04 RX ADMIN — SODIUM CHLORIDE, SODIUM LACTATE, POTASSIUM CHLORIDE, AND CALCIUM CHLORIDE: .6; .31; .03; .02 INJECTION, SOLUTION INTRAVENOUS at 09:06

## 2020-06-04 RX ADMIN — DEXAMETHASONE SODIUM PHOSPHATE 4 MG: 4 INJECTION, SOLUTION INTRAMUSCULAR; INTRAVENOUS at 09:06

## 2020-06-04 RX ADMIN — FENTANYL CITRATE 50 MCG: 50 INJECTION INTRAMUSCULAR; INTRAVENOUS at 09:06

## 2020-06-04 RX ADMIN — PROPOFOL 150 MG: 10 INJECTION, EMULSION INTRAVENOUS at 09:06

## 2020-06-04 RX ADMIN — CEFAZOLIN 2 G: 330 INJECTION, POWDER, FOR SOLUTION INTRAMUSCULAR; INTRAVENOUS at 09:06

## 2020-06-04 RX ADMIN — GABAPENTIN 300 MG: 300 CAPSULE ORAL at 08:06

## 2020-06-04 NOTE — DISCHARGE INSTRUCTIONS
Shock Wave Lithotripsy  Passing a kidney stone can be very painful. Shock wave lithotripsy is a treatment that helps by breaking the kidney stone into smaller pieces that are easier to pass. This treatment is also called extracorporeal shock wave lithotripsy (ESWL). Lithotripsy takes about an hour. Its done in a hospital, lithotripsy center, or mobile lithotripsy van. You will likely go home the same day. This treatment is not used for all types of kidney stones. Your healthcare provider will discuss whether this is the right treatment for the type of stone you have.      Energy waves strike the stone, which begins to crack. The stone crumbles into tiny pieces.   During the procedure  · You get medicine to prevent pain and help you relax or sleep during lithotripsy. Once this takes effect, the procedure will start.  · A flexible tube (stent) with holes in it may be placed into your ureter, the tube that connects the kidney and the bladder. This helps keep urine flowing from the kidney.  · Your healthcare provider then uses X-ray or ultrasound to find the exact location of the kidney stone.  · Sound waves are aimed at the stone and sent at high speed. If youre awake, you may feel a tapping as they pass through your body.  After the procedure  · Youll be closely watched in a recovery room for about 1 to 3 hours. Antibiotics and pain medicine may be prescribed before you leave.  · Youll have a follow-up visit in a few weeks. If you received a stent, it will be removed. Your healthcare provider will also check for pieces of stone. If large pieces remain, you may need a second lithotripsy or another procedure.  Possible risks and complications  · Infection  · Bleeding in the kidney  · Bruising of the kidney or skin  · Blockage (obstruction) of the ureter  · Failure to break up the stone (other procedures may be needed)   Passing the stone  It can take a day to several weeks for the pieces of stone to leave your  body. Drink plenty of liquids to help flush your system. During this time:  · Your urine may be cloudy or slightly bloody. You may even see small pieces of stone.  · You may have a slight fever and some pain. Take prescribed or over-the-counter pain medicine as instructed by your healthcare provider.  · You may be asked to strain your urine to collect some stone particles. These will be studied in the lab.  When to call your healthcare provider   Call your healthcare provider right away if you have any of the following:  · Fever of 100.4°F (38°C) or higher, or as directed by your healthcare provider  · Heavy bleeding  · Pain that doesnt go away with medicine  · Upset stomach and vomiting  · Problems urinating   Date Last Reviewed: 1/1/2017  © 5707-8264 [employer]. [address] [place] [address], [place] [address]. All rights reserved. This information is not intended as a substitute for professional medical care. Always follow your healthcare professional's instructions.

## 2020-06-04 NOTE — ANESTHESIA POSTPROCEDURE EVALUATION
Anesthesia Post Evaluation    Patient: Enrique Yousif    Procedure(s) Performed: Procedure(s) (LRB):  LITHOTRIPSY, ESWL (Left)  REMOVAL, STENT, URETER (Left)    Final Anesthesia Type: general    Patient location during evaluation: PACU  Patient participation: Yes- Able to Participate  Level of consciousness: awake and alert and oriented  Post-procedure vital signs: reviewed and stable  Pain management: adequate  Airway patency: patent    PONV status at discharge: No PONV  Anesthetic complications: no      Cardiovascular status: blood pressure returned to baseline, hemodynamically stable and stable  Respiratory status: unassisted, spontaneous ventilation and room air  Hydration status: euvolemic  Follow-up not needed.          Vitals Value Taken Time   /72 6/4/2020 10:45 AM   Temp 36.4 °C (97.6 °F) 6/4/2020 10:45 AM   Pulse 66 6/4/2020 10:46 AM   Resp 14 6/4/2020 10:46 AM   SpO2 100 % 6/4/2020 10:46 AM   Vitals shown include unvalidated device data.      No case tracking events are documented in the log.      Pain/Valeria Score: Pain Rating Prior to Med Admin: 0 (6/4/2020  8:52 AM)  Valeria Score: 10 (6/4/2020 10:45 AM)

## 2020-06-04 NOTE — OP NOTE
Operative Note         SUMMARY     Surgery Date:  6/4/2020     Assistant:     Indications:  This is a 59-year-old male with a left renal stone  3 mm  He also has a stent from a previous procedure  Planning for lithotripsy and stent removal      Pre-op Diagnosis:   Left renal stone    Post-op Diagnosis:  Same    Procedure:  Left ESWL  With cystoscopic stent removal    Anesthesia: General    Description of Procedure: After consents were obtained the patient was taken to the operating room  Placed in a supine position  Anesthesia is given  Dornier lithotripter is brought into the room  Patient is properly positioned on the lithotripsy probe  The stone is  3       Mm, left lower pole  We began at a KV of 16 and gradually increased to 25    2000      Shocks were delivered  We then placed him in a frog-leg position and prepped the penis  I introduced a cystoscope into the bladder and removed his stent easily and without any complication    The procedure was done without any complication  After the procedure the patient is brought to the recovery room in satisfactory condition        Findings/Key Components:    Good fragmentation      Estimated Blood Loss:    None

## 2020-06-04 NOTE — TRANSFER OF CARE
"Anesthesia Transfer of Care Note    Patient: Enrique Yousif    Procedure(s) Performed: Procedure(s) (LRB):  LITHOTRIPSY, ESWL (Left)  REMOVAL, STENT, URETER (Left)    Patient location: PACU    Anesthesia Type: general    Transport from OR: Transported from OR on room air with adequate spontaneous ventilation    Post pain: adequate analgesia    Post assessment: no apparent anesthetic complications    Post vital signs: stable    Level of consciousness: awake and alert    Nausea/Vomiting: no nausea/vomiting    Complications: none    Transfer of care protocol was followed      Last vitals:   Visit Vitals  BP (!) 134/91 (BP Location: Left arm, Patient Position: Lying)   Pulse 86   Temp 36.9 °C (98.4 °F) (Oral)   Resp 20   Ht 5' 10" (1.778 m)   Wt 86.2 kg (190 lb)   SpO2 98%   BMI 27.26 kg/m²     "

## 2020-06-04 NOTE — ANESTHESIA PREPROCEDURE EVALUATION
06/04/2020  Enrique Yousif is a 59 y.o., male.    There is no problem list on file for this patient.      Past Surgical History:   Procedure Laterality Date    APPENDECTOMY  23yrs old    CYSTOSCOPY WITH CALCULUS EXTRACTION Left 4/23/2020    Procedure: CYSTOSCOPY, WITH CALCULUS REMOVAL;  Surgeon: Derian Acosta MD;  Location: [facility];  Service: Urology;  Laterality: Left;    EXTRACORPOREAL SHOCK WAVE LITHOTRIPSY Left 5/7/2020    Procedure: LITHOTRIPSY, ESWL;  Surgeon: Derian Acosta MD;  Location: [facility];  Service: Urology;  Laterality: Left;    Right Knee Orthoscopic Surgery  2014    Right Shoulder Rotator Cuff  2014    TONSILLECTOMY  Age 33yrs old    URETEROSCOPY N/A 4/23/2020    Procedure: URETEROSCOPY;  Surgeon: Derian Acosta MD;  Location: [facility];  Service: Urology;  Laterality: N/A;        Tobacco Use:  The patient  reports that he has never smoked. He has never used smokeless tobacco.     Results for orders placed or performed during the hospital encounter of 04/22/20   EKG 12-lead    Collection Time: 04/22/20  3:42 PM    Narrative    Test Reason : Z01.818,    Vent. Rate : 100 BPM     Atrial Rate : 100 BPM     P-R Int : 192 ms          QRS Dur : 128 ms      QT Int : 372 ms       P-R-T Axes : 080 072 063 degrees     QTc Int : 479 ms    Normal sinus rhythm  Right bundle branch block  Abnormal ECG  No previous ECGs available  Confirmed by Osvaldo Espinal MD ([id number]) on 4/22/2020 6:20:36 PM    Referred By: DERIAN ACOSTA           Confirmed By:Osvaldo Espinal MD             Lab Results   Component Value Date    WBC 6.33 06/02/2020    HGB 13.4 (L) 06/02/2020    HCT 40.0 06/02/2020    MCV 91 06/02/2020     06/02/2020     BMP  Lab Results   Component Value Date     06/02/2020    K 4.2 06/02/2020     06/02/2020    CO2 28 06/02/2020    BUN 18 06/02/2020     CREATININE 1.0 06/02/2020    CALCIUM 9.2 06/02/2020    ANIONGAP 9 06/02/2020    ESTGFRAFRICA >60.0 06/02/2020    EGFRNONAA >60.0 06/02/2020             Anesthesia Evaluation    I have reviewed the Patient Summary Reports.    I have reviewed the Nursing Notes. I have reviewed the NPO Status.   I have reviewed the Medications.     Review of Systems  Anesthesia Hx:  No problems with previous Anesthesia  History of prior surgery of interest to airway management or planning: Denies Family Hx of Anesthesia complications.   Denies Personal Hx of Anesthesia complications. (reports hx of slow awaking after knee procedure in remote past, no problems with recent anesthetics)   Social:  Non-Smoker    Hematology/Oncology:  Hematology Normal        Cardiovascular:  Cardiovascular Normal     Pulmonary:  Pulmonary Normal    Renal/:   renal calculi    Hepatic/GI:  Hepatic/GI Normal    Musculoskeletal:  Musculoskeletal Normal    Neurological:  Neurology Normal    Endocrine:  Endocrine Normal        Physical Exam  General:  Well nourished    Airway/Jaw/Neck:  Airway Findings: Mouth Opening: Normal Tongue: Normal  General Airway Assessment: Adult  Mallampati: II  TM Distance: Normal, at least 6 cm  Jaw/Neck Findings:  Neck ROM: Normal ROM       Chest/Lungs:  Chest/Lungs Findings: Clear to auscultation, Normal Respiratory Rate     Heart/Vascular:  Heart Findings: Rate: Normal  Rhythm: Regular Rhythm  Sounds: Normal     Abdomen:  Abdomen Findings: Normal    Musculoskeletal:  Musculoskeletal Findings: Normal   Skin:  Skin Findings: Normal    Mental Status:  Mental Status Findings:  Cooperative, Alert and Oriented         Anesthesia Plan  Type of Anesthesia, risks & benefits discussed:  Anesthesia Type:  general  Patient's Preference:   Intra-op Monitoring Plan: standard ASA monitors  Intra-op Monitoring Plan Comments:   Post Op Pain Control Plan: multimodal analgesia  Post Op Pain Control Plan Comments:   Induction:   IV  Beta Blocker:   Patient is not currently on a Beta-Blocker (No further documentation required).       Informed Consent: Patient understands risks and agrees with Anesthesia plan.  Questions answered. Anesthesia consent signed with patient.  ASA Score: 2     Day of Surgery Review of History & Physical: I have interviewed and examined the patient. I have reviewed the patient's H&P dated:  There are no significant changes.      Anesthesia Plan Notes: General LMA OK  Neurontin 300, Tylenol 1000 given in holding  Zofran/Decadron        Ready For Surgery From Anesthesia Perspective.

## 2020-06-04 NOTE — DISCHARGE SUMMARY
Patient comes in for left-sided lithotripsy  Also his stent was removed    Procedure is done w no complication    After a brief stay in recovery, patient is discharged in good condition    Meds given:  Lortab    F/erick office:  2 weeks with olu MURPHY

## 2020-06-04 NOTE — PLAN OF CARE
1230   Pt voiding blood tinged urine without difficulty, pt tolerating oral liquids, pt ready for discharge

## 2020-06-04 NOTE — H&P
[facility]  History & Physical    SUBJECTIVE:     Chief Complaint/Reason for Admission:     History of Present Illness:  Patient is a 59 y.o. male presents with   Left renal stone lower pole.. here for lithotripsy    No medications prior to admission.       Review of patient's allergies indicates:   Allergen Reactions    Penicillins Other (See Comments)     Flushed       Past Medical History:   Diagnosis Date    Kidney stone 2020     Past Surgical History:   Procedure Laterality Date    APPENDECTOMY  23yrs old    CYSTOSCOPY WITH CALCULUS EXTRACTION Left 4/23/2020    Procedure: CYSTOSCOPY, WITH CALCULUS REMOVAL;  Surgeon: Derian Aguillon MD;  Location: [facility] OR;  Service: Urology;  Laterality: Left;    EXTRACORPOREAL SHOCK WAVE LITHOTRIPSY Left 5/7/2020    Procedure: LITHOTRIPSY, ESWL;  Surgeon: Derian Aguillon MD;  Location: [facility] OR;  Service: Urology;  Laterality: Left;    Right Knee Orthoscopic Surgery  2014    Right Shoulder Rotator Cuff  2014    TONSILLECTOMY  Age 33yrs old    URETEROSCOPY N/A 4/23/2020    Procedure: URETEROSCOPY;  Surgeon: Derian Aguillon MD;  Location: [facility];  Service: Urology;  Laterality: N/A;     History reviewed. No pertinent family history.  Social History     Tobacco Use    Smoking status: Never Smoker    Smokeless tobacco: Never Used   Substance Use Topics    Alcohol use: Never     Frequency: Never    Drug use: Never        Review of Systems:  Negative      OBJECTIVE:     Vital Signs (Most Recent):  Temp: 98.4 °F (36.9 °C) (06/04/20 0757)  Pulse: 86 (06/04/20 0757)  Resp: 20 (06/04/20 0757)  BP: (!) 134/91 (06/04/20 0757)  SpO2: 98 % (06/04/20 0757)    Inpatient Medications:  Current Facility-Administered Medications   Medication Dose Route Frequency Provider Last Rate Last Dose    acetaminophen tablet 1,000 mg  1,000 mg Oral Once Kyle Sage MD        diphenhydrAMINE injection 12.5 mg  12.5 mg Intravenous Q15 Min PRN Kyle Sage,  MD        gabapentin capsule 300 mg  300 mg Oral Once Kyle Sage MD        HYDROmorphone injection 0.2 mg  0.2 mg Intravenous Q3 Min PRN Kyle Sage MD        levoFLOXacin 500 mg/100 mL IVPB 500 mg  500 mg Intravenous Q24H Derian Aguillon MD        meperidine injection 12.5 mg  12.5 mg Intravenous Q10 Min PRN Kyle Sage MD        ondansetron injection 4 mg  4 mg Intravenous Daily PRN Kyle Sage MD        oxyCODONE immediate release tablet 5 mg  5 mg Oral Q3H PRN Kyle Sage MD        promethazine (PHENERGAN) 6.25 mg in dextrose 5 % 50 mL IVPB  6.25 mg Intravenous Q10 Min PRN Kyle Sage MD        sodium chloride 0.9% flush 10 mL  10 mL Intravenous PRN Kyle Sage MD              ASSESSMENT/PLAN:       Left renal stone lower pole      Plan for lithotripsy and cystoscopic stent removed

## 2020-09-16 DIAGNOSIS — R10.9 ABDOMINAL PAIN: Primary | ICD-10-CM

## 2020-09-21 ENCOUNTER — HOSPITAL ENCOUNTER (OUTPATIENT)
Dept: RADIOLOGY | Facility: HOSPITAL | Age: 60
Discharge: HOME | End: 2020-09-21
Attending: FAMILY MEDICINE
Payer: COMMERCIAL

## 2020-09-21 DIAGNOSIS — R10.9 STOMACH ACHE: Primary | ICD-10-CM

## 2020-09-21 DIAGNOSIS — R10.9 ABDOMINAL PAIN: ICD-10-CM

## 2020-09-21 DIAGNOSIS — R10.9 STOMACH ACHE: ICD-10-CM

## 2020-09-21 LAB
CREAT SERPL-MCNC: 1 MG/DL (ref 0.5–1.4)
SAMPLE: NORMAL

## 2020-09-21 PROCEDURE — 25500020 PHARM REV CODE 255: Mod: PO

## 2020-09-21 PROCEDURE — 82565 ASSAY OF CREATININE: CPT | Mod: PO

## 2020-09-21 RX ADMIN — IOHEXOL 100 ML: 350 INJECTION, SOLUTION INTRAVENOUS at 03:09

## 2020-09-24 ENCOUNTER — OFFICE VISIT (OUTPATIENT)
Dept: SURGERY | Facility: CLINIC | Age: 60
End: 2020-09-24
Payer: COMMERCIAL

## 2020-09-24 VITALS — TEMPERATURE: 97 F | BODY MASS INDEX: 27.84 KG/M2 | WEIGHT: 194 LBS

## 2020-09-24 DIAGNOSIS — K40.20 NON-RECURRENT BILATERAL INGUINAL HERNIA WITHOUT OBSTRUCTION OR GANGRENE: Primary | ICD-10-CM

## 2020-09-24 PROCEDURE — 3008F PR BODY MASS INDEX (BMI) DOCUMENTED: ICD-10-PCS | Mod: CPTII,S$GLB,, | Performed by: PHYSICIAN ASSISTANT

## 2020-09-24 PROCEDURE — 99213 PR OFFICE/OUTPT VISIT, EST, LEVL III, 20-29 MIN: ICD-10-PCS | Mod: S$GLB,,, | Performed by: PHYSICIAN ASSISTANT

## 2020-09-24 PROCEDURE — 99999 PR PBB SHADOW E&M-EST. PATIENT-LVL II: CPT | Mod: PBBFAC,,, | Performed by: PHYSICIAN ASSISTANT

## 2020-09-24 PROCEDURE — 3008F BODY MASS INDEX DOCD: CPT | Mod: CPTII,S$GLB,, | Performed by: PHYSICIAN ASSISTANT

## 2020-09-24 PROCEDURE — 99999 PR PBB SHADOW E&M-EST. PATIENT-LVL II: ICD-10-PCS | Mod: PBBFAC,,, | Performed by: PHYSICIAN ASSISTANT

## 2020-09-24 PROCEDURE — 99213 OFFICE O/P EST LOW 20 MIN: CPT | Mod: S$GLB,,, | Performed by: PHYSICIAN ASSISTANT

## 2020-09-24 NOTE — PROGRESS NOTES
Patient ID: Enrique Yousif is a 60 y.o. male.    Chief Complaint: Consult (possible hernia )      HPI:  Enrique Yousif is a 60 y.o. male who was referred to the General Surgery department for eval of bilateral inguinal hernias which were noted on recent renal CT. He recently developed lower abdominal cramping pain that comes and goes and has a hx of kidney stones. The ct was done which was unremarkable except bilateral fat containing inguinal hernias. He reports the he did not know he had hernias and denies any pain or discomfort associated with this. He does have occasional discomfort on the right lower abdomen when walking down stairs. He denies pain with straining or valsalva.       Review of Systems   Constitutional: Negative for activity change, chills and fever.   Respiratory: Negative for shortness of breath.    Cardiovascular: Negative for chest pain.   Gastrointestinal: Positive for abdominal pain (occasional), constipation (occasional) and diarrhea (occasional). Negative for nausea and vomiting.   Genitourinary: Negative for dysuria.   Musculoskeletal: Negative for myalgias.   Skin: Negative for wound.   Neurological: Negative for weakness.   Hematological: Does not bruise/bleed easily.   Psychiatric/Behavioral: The patient is not nervous/anxious.        Current Outpatient Medications   Medication Sig Dispense Refill    HYDROcodone-acetaminophen (NORCO) 5-325 mg per tablet Take 1 tablet by mouth every 4 (four) hours as needed for Pain. (Patient not taking: Reported on 9/24/2020) 12 tablet 0     No current facility-administered medications for this visit.        Review of patient's allergies indicates:   Allergen Reactions    Penicillins Other (See Comments)     Flushed       Past Medical History:   Diagnosis Date    Kidney stone 2020       Past Surgical History:   Procedure Laterality Date    APPENDECTOMY  23yrs old    CYSTOSCOPY WITH CALCULUS EXTRACTION Left 4/23/2020    Procedure:  CYSTOSCOPY, WITH CALCULUS REMOVAL;  Surgeon: Derian Aguillon MD;  Location: [facility];  Service: Urology;  Laterality: Left;    EXTRACORPOREAL SHOCK WAVE LITHOTRIPSY Left 5/7/2020    Procedure: LITHOTRIPSY, ESWL;  Surgeon: Derian Aguillon MD;  Location: [facility] OR;  Service: Urology;  Laterality: Left;    EXTRACORPOREAL SHOCK WAVE LITHOTRIPSY Left 6/4/2020    Procedure: LITHOTRIPSY, ESWL;  Surgeon: Derian Aguillon MD;  Location: [facility] OR;  Service: Urology;  Laterality: Left;    Right Knee Orthoscopic Surgery  2014    Right Shoulder Rotator Cuff  2014    TONSILLECTOMY  Age 33yrs old    URETEROSCOPY N/A 4/23/2020    Procedure: URETEROSCOPY;  Surgeon: Derian Aguillon MD;  Location: [facility];  Service: Urology;  Laterality: N/A;       No family history on file.    Social History     Socioeconomic History    Marital status: Single     Spouse name: Not on file    Number of children: Not on file    Years of education: Not on file    Highest education level: Not on file   Occupational History    Not on file   Social Needs    Financial resource strain: Not on file    Food insecurity     Worry: Not on file     Inability: Not on file    Transportation needs     Medical: Not on file     Non-medical: Not on file   Tobacco Use    Smoking status: Never Smoker    Smokeless tobacco: Never Used   Substance and Sexual Activity    Alcohol use: Never     Frequency: Never    Drug use: Never    Sexual activity: Not on file   Lifestyle    Physical activity     Days per week: Not on file     Minutes per session: Not on file    Stress: Not on file   Relationships    Social connections     Talks on phone: Not on file     Gets together: Not on file     Attends [employer] service: Not on file     Active member of club or organization: Not on file     Attends meetings of clubs or organizations: Not on file     Relationship status: Not on file   Other Topics Concern    Not on file   Social History Narrative    Not  on file       Vitals:    09/24/20 1102   Temp: 97.3 °F (36.3 °C)       Physical Exam  Vitals signs reviewed.   Constitutional:       Appearance: Normal appearance. He is well-developed.   HENT:      Head: Normocephalic and atraumatic.   Cardiovascular:      Rate and Rhythm: Normal rate and regular rhythm.   Pulmonary:      Effort: Pulmonary effort is normal. No respiratory distress.   Abdominal:      General: Abdomen is flat.      Palpations: Abdomen is soft.      Hernia: A hernia (bilateral inguinal hernias right slightly larger than left. both easily reducible and and non-tender. f) is present.   Musculoskeletal: Normal range of motion.   Skin:     General: Skin is warm and dry.   Neurological:      Mental Status: He is alert.   Psychiatric:         Thought Content: Thought content normal.         Assessment & Plan:    bilateral inguinal hernia - currently minimally symptomatic and patient has a trip planned in the near future. Discussed that hernia repair is not urgent at this point since he has minimal symptoms. We discussed robotic bilateral hernia repair surgery in detail including risks, alternatives, and pre and post op expectations. He would like to call when he is ready to proceed with surgery.

## 2020-10-12 ENCOUNTER — TELEPHONE (OUTPATIENT)
Dept: SURGERY | Facility: CLINIC | Age: 60
End: 2020-10-12

## 2020-10-12 NOTE — TELEPHONE ENCOUNTER
Spoke to pt, plan for 11/16/20, BIH repair with Robot at [facility]. Covid on 11/13/20 with [facility].   Notify Dr Andrea.

## 2020-10-12 NOTE — TELEPHONE ENCOUNTER
----- Message from Hellen Jung sent at 10/12/2020  9:13 AM CDT -----  Contact: patient  Type: Needs Medical Advice  Who Called:  patient  Symptoms (please be specific):    How long has patient had these symptoms:    Pharmacy name and phone #:    Best Call Back Number: [phone]  Additional Information: requesting a call back to scheduling surgery

## 2020-10-14 ENCOUNTER — TELEPHONE (OUTPATIENT)
Dept: SURGERY | Facility: CLINIC | Age: 60
End: 2020-10-14

## 2020-10-14 DIAGNOSIS — K40.21 BILATERAL RECURRENT INGUINAL HERNIA WITHOUT OBSTRUCTION OR GANGRENE: Primary | ICD-10-CM

## 2020-10-14 RX ORDER — SODIUM CHLORIDE 9 MG/ML
INJECTION, SOLUTION INTRAVENOUS CONTINUOUS
Status: CANCELLED | OUTPATIENT
Start: 2020-10-14

## 2020-10-14 NOTE — TELEPHONE ENCOUNTER
Surgery is scheduled for 11/16/20 arrival time will be given by the the preop nurse.  The preop nurse will call you from [phone]  Nothing to eat or drink after midnight.  Someone to drive you home.    THE PREOP NURSE WILL CALL, SOMETIMES AS LATE AS 4 or 5 PM IN THE AFTERNOON THE DAY BEFORE SURGERY.    Bathe the night before and the morning of your procedure with a Chlorhexidine wash such as Hibiclens, can be purchased at most Pharmacy's no prescription needed.    Special Instruction:  Preop visit and Covid on 11/13/20

## 2020-10-16 ENCOUNTER — TELEPHONE (OUTPATIENT)
Dept: SURGERY | Facility: CLINIC | Age: 60
End: 2020-10-16

## 2020-11-13 ENCOUNTER — ANESTHESIA EVENT (OUTPATIENT)
Dept: SURGERY | Facility: HOSPITAL | Age: 60
End: 2020-11-13
Payer: COMMERCIAL

## 2020-11-13 ENCOUNTER — HOSPITAL ENCOUNTER (OUTPATIENT)
Dept: PREADMISSION TESTING | Facility: HOSPITAL | Age: 60
Discharge: HOME | End: 2020-11-13
Attending: SURGERY
Payer: COMMERCIAL

## 2020-11-13 ENCOUNTER — LAB VISIT (OUTPATIENT)
Dept: PRIMARY CARE CLINIC | Facility: CLINIC | Age: 60
End: 2020-11-13
Payer: COMMERCIAL

## 2020-11-13 VITALS — WEIGHT: 187 LBS | BODY MASS INDEX: 26.77 KG/M2 | HEIGHT: 70 IN

## 2020-11-13 DIAGNOSIS — K40.21 BILATERAL RECURRENT INGUINAL HERNIA WITHOUT OBSTRUCTION OR GANGRENE: ICD-10-CM

## 2020-11-13 LAB
ANION GAP SERPL CALC-SCNC: 8 MMOL/L (ref 8–16)
BASOPHILS # BLD AUTO: 0.03 K/UL (ref 0–0.2)
BASOPHILS NFR BLD: 0.4 % (ref 0–1.9)
BUN SERPL-MCNC: 17 MG/DL (ref 8–23)
CALCIUM SERPL-MCNC: 9.2 MG/DL (ref 8.7–10.5)
CHLORIDE SERPL-SCNC: 106 MMOL/L (ref 95–110)
CO2 SERPL-SCNC: 28 MMOL/L (ref 23–29)
CREAT SERPL-MCNC: 1 MG/DL (ref 0.5–1.4)
DIFFERENTIAL METHOD: ABNORMAL
EOSINOPHIL # BLD AUTO: 0.3 K/UL (ref 0–0.5)
EOSINOPHIL NFR BLD: 4.4 % (ref 0–8)
ERYTHROCYTE [DISTWIDTH] IN BLOOD BY AUTOMATED COUNT: 12.7 % (ref 11.5–14.5)
EST. GFR  (AFRICAN AMERICAN): >60 ML/MIN/1.73 M^2
EST. GFR  (NON AFRICAN AMERICAN): >60 ML/MIN/1.73 M^2
GLUCOSE SERPL-MCNC: 118 MG/DL (ref 70–110)
HCT VFR BLD AUTO: 40.1 % (ref 40–54)
HGB BLD-MCNC: 13.7 G/DL (ref 14–18)
IMM GRANULOCYTES # BLD AUTO: 0.03 K/UL (ref 0–0.04)
IMM GRANULOCYTES NFR BLD AUTO: 0.4 % (ref 0–0.5)
LYMPHOCYTES # BLD AUTO: 1.6 K/UL (ref 1–4.8)
LYMPHOCYTES NFR BLD: 23.3 % (ref 18–48)
MCH RBC QN AUTO: 30.9 PG (ref 27–31)
MCHC RBC AUTO-ENTMCNC: 34.2 G/DL (ref 32–36)
MCV RBC AUTO: 90 FL (ref 82–98)
MONOCYTES # BLD AUTO: 0.4 K/UL (ref 0.3–1)
MONOCYTES NFR BLD: 6.5 % (ref 4–15)
NEUTROPHILS # BLD AUTO: 4.4 K/UL (ref 1.8–7.7)
NEUTROPHILS NFR BLD: 65 % (ref 38–73)
NRBC BLD-RTO: 0 /100 WBC
PLATELET # BLD AUTO: 203 K/UL (ref 150–350)
PMV BLD AUTO: 9 FL (ref 9.2–12.9)
POTASSIUM SERPL-SCNC: 3.9 MMOL/L (ref 3.5–5.1)
RBC # BLD AUTO: 4.44 M/UL (ref 4.6–6.2)
SODIUM SERPL-SCNC: 142 MMOL/L (ref 136–145)
WBC # BLD AUTO: 6.81 K/UL (ref 3.9–12.7)

## 2020-11-13 PROCEDURE — 93010 EKG 12-LEAD: ICD-10-PCS | Mod: ,,, | Performed by: SPECIALIST

## 2020-11-13 PROCEDURE — 85025 COMPLETE CBC W/AUTO DIFF WBC: CPT

## 2020-11-13 PROCEDURE — U0003 INFECTIOUS AGENT DETECTION BY NUCLEIC ACID (DNA OR RNA); SEVERE ACUTE RESPIRATORY SYNDROME CORONAVIRUS 2 (SARS-COV-2) (CORONAVIRUS DISEASE [COVID-19]), AMPLIFIED PROBE TECHNIQUE, MAKING USE OF HIGH THROUGHPUT TECHNOLOGIES AS DESCRIBED BY CMS-2020-01-R: HCPCS

## 2020-11-13 PROCEDURE — 99900104 DSU ONLY-NO CHARGE-EA ADD'L HR (STAT)

## 2020-11-13 PROCEDURE — 36415 COLL VENOUS BLD VENIPUNCTURE: CPT

## 2020-11-13 PROCEDURE — 99900103 DSU ONLY-NO CHARGE-INITIAL HR (STAT)

## 2020-11-13 PROCEDURE — 80048 BASIC METABOLIC PNL TOTAL CA: CPT

## 2020-11-13 PROCEDURE — 93005 ELECTROCARDIOGRAM TRACING: CPT

## 2020-11-13 PROCEDURE — 93010 ELECTROCARDIOGRAM REPORT: CPT | Mod: ,,, | Performed by: SPECIALIST

## 2020-11-13 RX ORDER — ZOLPIDEM TARTRATE 10 MG/1
TABLET ORAL
COMMUNITY
Start: 2020-11-06

## 2020-11-13 NOTE — DISCHARGE INSTRUCTIONS
To confirm, Your doctor has instructed you that surgery is scheduled for: 11/16/2020    Please report to [facility], Registration the morning of surgery. You must check-in and receive a wristband before going to your procedure.    Pre-Op will call the FRIDAY prior to surgery between 1:00 and 6:00 PM with the final arrival time.  Phone number: [phone]    PLEASE NOTE:  The surgery schedule has many variables which may affect the time of your surgery case.  Family members should be available if your surgery time changes.  Plan to be here the day of your procedure between 4-6 hours.    MEDICATIONS:  TAKE ONLY THESE MEDICATIONS WITH A SMALL SIP OF WATER THE MORNING OF YOUR PROCEDURE:  NONE      DO NOT TAKE THESE MEDICATIONS 5-7 DAYS PRIOR to your procedure or per your surgeon's request:   ASPIRIN, ALEVE, ADVIL, IBUPROFEN, FISH OIL VITAMIN E, HERBALS  (May take Tylenol)    ONLY if you are prescribed any types of blood thinners such as:  Aspirin, Coumadin, Plavix, Pradaxa, Xarelto, Aggrenox, Effient, Eliquis, Savasya, Brilinta, or any other, ask your surgeon whether you should stop taking them and how long before surgery you should stop.  You may also need to verify with the prescribing physician if it is ok to stop your medication.      INSTRUCTIONS IMPORTANT!!  · Do not eat or drink anything between midnight and the time of your procedure- this includes gum, mints, and candy.  · Do not smoke or drink alcoholic beverages 24 hours prior to your procedure.  · Shower the night before AND the morning of your procedure with a Chlorhexidine wash such as Hibiclens or Dial antibacterial soap from the neck down.  Do not get it on your face or in your eyes.  You may use your own shampoo and face wash. This helps your skin to be as bacteria free as possible.    · If you wear contact lenses, dentures, hearing aids or glasses, bring a container to put them in during surgery and give to a family member for  safe keeping.  Please leave all jewelry, piercing's and valuables at home.   · DO NOT remove hair from the surgery site.  Do not shave the incision site unless you are given specific instructions to do so.    · ONLY if you have been diagnosed with sleep apnea please bring your C-PAP machine.  · ONLY if you wear home oxygen please bring your portable oxygen tank the day of your procedure.  · ONLY if you have a history of OPEN HEART SURGERY you will need a clearance from your Cardiologist per Anesthesia.      · ONLY for patients requiring bowel prep, written instructions will be given by your doctor's office.  · ONLY if you have a neuro stimulator, please bring the controller with you the morning of surgery  · ONLY if a type and screen test is needed before surgery, please return: N/A  · If your doctor has scheduled you for an overnight stay, bring a small overnight bag with any personal items you need.  · Make arrangements in advance for transportation home by a responsible adult.  It is not safe to drive a vehicle during the 24 hours after anesthesia.     · ONLY ONE VISITOR PER PATIENT IS ALLOWED TO COME IN THE HOSPITAL THE DAY OF PROCEDURE.   · Visiting hours are 8:00AM-6:00PM. For the safety of all patients, visitors under the age of 12 are not allowed above the first floor of the hospital.    · All [facility] facilities and properties are tobacco free.  Smoking is NOT allowed.       If you have any questions about these instructions, call Pre-Op Admit  Nursing at [phone] or the Pre-Op Day Surgery Unit at [phone].

## 2020-11-14 LAB — SARS-COV-2 RNA RESP QL NAA+PROBE: NOT DETECTED

## 2020-11-16 ENCOUNTER — ANESTHESIA (OUTPATIENT)
Dept: SURGERY | Facility: HOSPITAL | Age: 60
End: 2020-11-16
Payer: COMMERCIAL

## 2020-11-16 ENCOUNTER — HOSPITAL ENCOUNTER (OUTPATIENT)
Facility: HOSPITAL | Age: 60
Discharge: HOME | End: 2020-11-16
Attending: SURGERY | Admitting: SURGERY
Payer: COMMERCIAL

## 2020-11-16 DIAGNOSIS — K40.21 BILATERAL RECURRENT INGUINAL HERNIA WITHOUT OBSTRUCTION OR GANGRENE: ICD-10-CM

## 2020-11-16 PROCEDURE — 36000710: Performed by: SURGERY

## 2020-11-16 PROCEDURE — 25000003 PHARM REV CODE 250: Performed by: ANESTHESIOLOGY

## 2020-11-16 PROCEDURE — 37000008 HC ANESTHESIA 1ST 15 MINUTES: Performed by: SURGERY

## 2020-11-16 PROCEDURE — C1781 MESH (IMPLANTABLE): HCPCS | Performed by: SURGERY

## 2020-11-16 PROCEDURE — 25000003 PHARM REV CODE 250: Performed by: SURGERY

## 2020-11-16 PROCEDURE — 63600175 PHARM REV CODE 636 W HCPCS: Performed by: SURGERY

## 2020-11-16 PROCEDURE — 63600175 PHARM REV CODE 636 W HCPCS: Performed by: ANESTHESIOLOGY

## 2020-11-16 PROCEDURE — C9290 INJ, BUPIVACAINE LIPOSOME: HCPCS | Performed by: SURGERY

## 2020-11-16 PROCEDURE — 49650 LAP ING HERNIA REPAIR INIT: CPT | Mod: 50,,, | Performed by: SURGERY

## 2020-11-16 PROCEDURE — 71000016 HC POSTOP RECOV ADDL HR: Performed by: SURGERY

## 2020-11-16 PROCEDURE — 71000015 HC POSTOP RECOV 1ST HR: Performed by: SURGERY

## 2020-11-16 PROCEDURE — 99900103 DSU ONLY-NO CHARGE-INITIAL HR (STAT): Performed by: SURGERY

## 2020-11-16 PROCEDURE — D9220A PRA ANESTHESIA: Mod: CRNA,,, | Performed by: REGISTERED NURSE

## 2020-11-16 PROCEDURE — D9220A PRA ANESTHESIA: ICD-10-PCS | Mod: ANES,,, | Performed by: ANESTHESIOLOGY

## 2020-11-16 PROCEDURE — 49650 PR LAP,INGUINAL HERNIA REPR,INITIAL: ICD-10-PCS | Mod: 50,,, | Performed by: SURGERY

## 2020-11-16 PROCEDURE — 36000711: Performed by: SURGERY

## 2020-11-16 PROCEDURE — D9220A PRA ANESTHESIA: Mod: ANES,,, | Performed by: ANESTHESIOLOGY

## 2020-11-16 PROCEDURE — 63600175 PHARM REV CODE 636 W HCPCS: Performed by: REGISTERED NURSE

## 2020-11-16 PROCEDURE — 71000033 HC RECOVERY, INTIAL HOUR: Performed by: SURGERY

## 2020-11-16 PROCEDURE — 37000009 HC ANESTHESIA EA ADD 15 MINS: Performed by: SURGERY

## 2020-11-16 PROCEDURE — D9220A PRA ANESTHESIA: ICD-10-PCS | Mod: CRNA,,, | Performed by: REGISTERED NURSE

## 2020-11-16 PROCEDURE — 27201423 OPTIME MED/SURG SUP & DEVICES STERILE SUPPLY: Performed by: SURGERY

## 2020-11-16 PROCEDURE — 25000003 PHARM REV CODE 250: Performed by: REGISTERED NURSE

## 2020-11-16 PROCEDURE — 99900104 DSU ONLY-NO CHARGE-EA ADD'L HR (STAT): Performed by: SURGERY

## 2020-11-16 PROCEDURE — 71000039 HC RECOVERY, EACH ADD'L HOUR: Performed by: SURGERY

## 2020-11-16 DEVICE — MESH POLY 3DMAX MED RIGHT: Type: IMPLANTABLE DEVICE | Site: GROIN | Status: FUNCTIONAL

## 2020-11-16 DEVICE — MESH POLY 3DMAX LG LEFT: Type: IMPLANTABLE DEVICE | Site: GROIN | Status: FUNCTIONAL

## 2020-11-16 RX ORDER — FENTANYL CITRATE 50 UG/ML
INJECTION, SOLUTION INTRAMUSCULAR; INTRAVENOUS
Status: DISCONTINUED | OUTPATIENT
Start: 2020-11-16 | End: 2020-11-16

## 2020-11-16 RX ORDER — CLINDAMYCIN PHOSPHATE 900 MG/50ML
900 INJECTION, SOLUTION INTRAVENOUS
Status: DISCONTINUED | OUTPATIENT
Start: 2020-11-16 | End: 2020-11-16 | Stop reason: HOSPADM

## 2020-11-16 RX ORDER — HYDROCODONE BITARTRATE AND ACETAMINOPHEN 5; 325 MG/1; MG/1
1 TABLET ORAL EVERY 4 HOURS PRN
Status: CANCELLED | OUTPATIENT
Start: 2020-11-16

## 2020-11-16 RX ORDER — PHENYLEPHRINE HYDROCHLORIDE 10 MG/ML
INJECTION INTRAVENOUS
Status: DISCONTINUED | OUTPATIENT
Start: 2020-11-16 | End: 2020-11-16

## 2020-11-16 RX ORDER — ONDANSETRON 2 MG/ML
INJECTION INTRAMUSCULAR; INTRAVENOUS
Status: DISCONTINUED | OUTPATIENT
Start: 2020-11-16 | End: 2020-11-16

## 2020-11-16 RX ORDER — LIDOCAINE HYDROCHLORIDE 10 MG/ML
1 INJECTION, SOLUTION EPIDURAL; INFILTRATION; INTRACAUDAL; PERINEURAL ONCE
Status: DISCONTINUED | OUTPATIENT
Start: 2020-11-16 | End: 2020-11-16 | Stop reason: HOSPADM

## 2020-11-16 RX ORDER — KETOROLAC TROMETHAMINE 30 MG/ML
INJECTION, SOLUTION INTRAMUSCULAR; INTRAVENOUS
Status: DISCONTINUED | OUTPATIENT
Start: 2020-11-16 | End: 2020-11-16

## 2020-11-16 RX ORDER — PROPOFOL 10 MG/ML
VIAL (ML) INTRAVENOUS
Status: DISCONTINUED | OUTPATIENT
Start: 2020-11-16 | End: 2020-11-16

## 2020-11-16 RX ORDER — LIDOCAINE HYDROCHLORIDE 20 MG/ML
INJECTION INTRAVENOUS
Status: DISCONTINUED | OUTPATIENT
Start: 2020-11-16 | End: 2020-11-16

## 2020-11-16 RX ORDER — HYDROCODONE BITARTRATE AND ACETAMINOPHEN 5; 325 MG/1; MG/1
1 TABLET ORAL EVERY 6 HOURS PRN
Qty: 20 TABLET | Refills: 0 | Status: SHIPPED | OUTPATIENT
Start: 2020-11-16

## 2020-11-16 RX ORDER — ACETAMINOPHEN 10 MG/ML
INJECTION, SOLUTION INTRAVENOUS
Status: DISCONTINUED | OUTPATIENT
Start: 2020-11-16 | End: 2020-11-16

## 2020-11-16 RX ORDER — ROCURONIUM BROMIDE 10 MG/ML
INJECTION, SOLUTION INTRAVENOUS
Status: DISCONTINUED | OUTPATIENT
Start: 2020-11-16 | End: 2020-11-16

## 2020-11-16 RX ORDER — FENTANYL CITRATE 50 UG/ML
25 INJECTION, SOLUTION INTRAMUSCULAR; INTRAVENOUS EVERY 5 MIN PRN
Status: DISCONTINUED | OUTPATIENT
Start: 2020-11-16 | End: 2020-11-16 | Stop reason: HOSPADM

## 2020-11-16 RX ORDER — SODIUM CHLORIDE 9 MG/ML
INJECTION, SOLUTION INTRAVENOUS CONTINUOUS
Status: DISCONTINUED | OUTPATIENT
Start: 2020-11-16 | End: 2020-11-16 | Stop reason: HOSPADM

## 2020-11-16 RX ORDER — BUPIVACAINE HYDROCHLORIDE AND EPINEPHRINE 2.5; 5 MG/ML; UG/ML
INJECTION, SOLUTION EPIDURAL; INFILTRATION; INTRACAUDAL; PERINEURAL
Status: DISCONTINUED | OUTPATIENT
Start: 2020-11-16 | End: 2020-11-16 | Stop reason: HOSPADM

## 2020-11-16 RX ORDER — MIDAZOLAM HYDROCHLORIDE 1 MG/ML
INJECTION, SOLUTION INTRAMUSCULAR; INTRAVENOUS
Status: DISCONTINUED | OUTPATIENT
Start: 2020-11-16 | End: 2020-11-16

## 2020-11-16 RX ORDER — HYDROMORPHONE HYDROCHLORIDE 2 MG/ML
0.2 INJECTION, SOLUTION INTRAMUSCULAR; INTRAVENOUS; SUBCUTANEOUS EVERY 5 MIN PRN
Status: DISCONTINUED | OUTPATIENT
Start: 2020-11-16 | End: 2020-11-16 | Stop reason: HOSPADM

## 2020-11-16 RX ORDER — DEXAMETHASONE SODIUM PHOSPHATE 4 MG/ML
INJECTION, SOLUTION INTRA-ARTICULAR; INTRALESIONAL; INTRAMUSCULAR; INTRAVENOUS; SOFT TISSUE
Status: DISCONTINUED | OUTPATIENT
Start: 2020-11-16 | End: 2020-11-16

## 2020-11-16 RX ORDER — NEOSTIGMINE METHYLSULFATE 1 MG/ML
INJECTION, SOLUTION INTRAVENOUS
Status: DISCONTINUED | OUTPATIENT
Start: 2020-11-16 | End: 2020-11-16

## 2020-11-16 RX ORDER — KETAMINE HYDROCHLORIDE 100 MG/ML
INJECTION, SOLUTION INTRAMUSCULAR; INTRAVENOUS
Status: DISCONTINUED | OUTPATIENT
Start: 2020-11-16 | End: 2020-11-16

## 2020-11-16 RX ORDER — ONDANSETRON 2 MG/ML
4 INJECTION INTRAMUSCULAR; INTRAVENOUS EVERY 12 HOURS PRN
Status: CANCELLED | OUTPATIENT
Start: 2020-11-16

## 2020-11-16 RX ORDER — SODIUM CHLORIDE 9 MG/ML
INJECTION, SOLUTION INTRAVENOUS CONTINUOUS
Status: CANCELLED | OUTPATIENT
Start: 2020-11-16

## 2020-11-16 RX ORDER — OXYCODONE HYDROCHLORIDE 5 MG/1
5 TABLET ORAL
Status: DISCONTINUED | OUTPATIENT
Start: 2020-11-16 | End: 2020-11-16 | Stop reason: HOSPADM

## 2020-11-16 RX ORDER — SODIUM CHLORIDE, SODIUM LACTATE, POTASSIUM CHLORIDE, CALCIUM CHLORIDE 600; 310; 30; 20 MG/100ML; MG/100ML; MG/100ML; MG/100ML
INJECTION, SOLUTION INTRAVENOUS CONTINUOUS
Status: DISCONTINUED | OUTPATIENT
Start: 2020-11-16 | End: 2020-11-16 | Stop reason: HOSPADM

## 2020-11-16 RX ADMIN — ACETAMINOPHEN 1000 MG: 10 INJECTION, SOLUTION INTRAVENOUS at 07:11

## 2020-11-16 RX ADMIN — KETAMINE HYDROCHLORIDE 30 MG: 100 INJECTION, SOLUTION, CONCENTRATE INTRAMUSCULAR; INTRAVENOUS at 07:11

## 2020-11-16 RX ADMIN — GLYCOPYRROLATE 0.2 MG: 0.2 INJECTION, SOLUTION INTRAMUSCULAR; INTRAVITREAL at 07:11

## 2020-11-16 RX ADMIN — ONDANSETRON 4 MG: 2 INJECTION, SOLUTION INTRAMUSCULAR; INTRAVENOUS at 07:11

## 2020-11-16 RX ADMIN — PHENYLEPHRINE HYDROCHLORIDE 100 MCG: 10 INJECTION INTRAVENOUS at 08:11

## 2020-11-16 RX ADMIN — SODIUM CHLORIDE, SODIUM GLUCONATE, SODIUM ACETATE, POTASSIUM CHLORIDE, MAGNESIUM CHLORIDE, SODIUM PHOSPHATE, DIBASIC, AND POTASSIUM PHOSPHATE: .53; .5; .37; .037; .03; .012; .00082 INJECTION, SOLUTION INTRAVENOUS at 08:11

## 2020-11-16 RX ADMIN — PROMETHAZINE HYDROCHLORIDE 6.25 MG: 25 INJECTION INTRAMUSCULAR; INTRAVENOUS at 10:11

## 2020-11-16 RX ADMIN — NEOSTIGMINE METHYLSULFATE 4 MG: 1 INJECTION INTRAVENOUS at 08:11

## 2020-11-16 RX ADMIN — KETOROLAC TROMETHAMINE 30 MG: 30 INJECTION, SOLUTION INTRAMUSCULAR; INTRAVENOUS at 08:11

## 2020-11-16 RX ADMIN — CLINDAMYCIN PHOSPHATE 900 MG: 18 INJECTION, SOLUTION INTRAVENOUS at 07:11

## 2020-11-16 RX ADMIN — FENTANYL CITRATE 50 MCG: 50 INJECTION, SOLUTION INTRAMUSCULAR; INTRAVENOUS at 07:11

## 2020-11-16 RX ADMIN — ROCURONIUM BROMIDE 10 MG: 10 INJECTION, SOLUTION INTRAVENOUS at 07:11

## 2020-11-16 RX ADMIN — MIDAZOLAM 2 MG: 1 INJECTION INTRAMUSCULAR; INTRAVENOUS at 07:11

## 2020-11-16 RX ADMIN — LIDOCAINE HYDROCHLORIDE 75 MG: 20 INJECTION, SOLUTION INTRAVENOUS at 07:11

## 2020-11-16 RX ADMIN — PHENYLEPHRINE HYDROCHLORIDE 0.3 MCG/KG/MIN: 10 INJECTION INTRAVENOUS at 07:11

## 2020-11-16 RX ADMIN — SODIUM CHLORIDE, SODIUM GLUCONATE, SODIUM ACETATE, POTASSIUM CHLORIDE, MAGNESIUM CHLORIDE, SODIUM PHOSPHATE, DIBASIC, AND POTASSIUM PHOSPHATE: .53; .5; .37; .037; .03; .012; .00082 INJECTION, SOLUTION INTRAVENOUS at 07:11

## 2020-11-16 RX ADMIN — PHENYLEPHRINE HYDROCHLORIDE 100 MCG: 10 INJECTION INTRAVENOUS at 07:11

## 2020-11-16 RX ADMIN — ROCURONIUM BROMIDE 40 MG: 10 INJECTION, SOLUTION INTRAVENOUS at 07:11

## 2020-11-16 RX ADMIN — PROPOFOL 150 MG: 10 INJECTION, EMULSION INTRAVENOUS at 07:11

## 2020-11-16 RX ADMIN — DEXAMETHASONE SODIUM PHOSPHATE 4 MG: 4 INJECTION, SOLUTION INTRA-ARTICULAR; INTRALESIONAL; INTRAMUSCULAR; INTRAVENOUS; SOFT TISSUE at 07:11

## 2020-11-16 RX ADMIN — GLYCOPYRROLATE 0.4 MG: 0.2 INJECTION, SOLUTION INTRAMUSCULAR; INTRAVITREAL at 08:11

## 2020-11-16 NOTE — DISCHARGE SUMMARY
[facility]  Discharge Note  Short Stay    Procedure(s) (LRB):  ROBOTIC REPAIR, HERNIA, INGUINAL (Bilateral)    OUTCOME: Patient tolerated treatment/procedure well without complication and is now ready for discharge.    DISPOSITION: Home or Self Care    FINAL DIAGNOSIS:  Bilateral recurrent inguinal hernia without obstruction or gangrene    FOLLOWUP: In clinic    DISCHARGE INSTRUCTIONS:    Discharge Procedure Orders   Diet general     Lifting restrictions     Call MD for:  extreme fatigue     Call MD for:  persistent dizziness or light-headedness     Call MD for:  hives     Call MD for:  difficulty breathing, headache or visual disturbances     Call MD for:  severe uncontrolled pain     Call MD for:  temperature >100.4     Call MD for:  persistent nausea and vomiting     Call MD for:  redness, tenderness, or signs of infection (pain, swelling, redness, odor or green/yellow discharge around incision site)     Remove dressing in 48 hours

## 2020-11-16 NOTE — ANESTHESIA POSTPROCEDURE EVALUATION
Anesthesia Post Evaluation    Patient: Enrique Yousif    Procedure(s) Performed: Procedure(s) (LRB):  ROBOTIC REPAIR, HERNIA, INGUINAL (Bilateral)    Final Anesthesia Type: general    Patient location during evaluation: PACU  Patient participation: No - Unable to Participate, Intubation  Level of consciousness: sedated  Post-procedure vital signs: reviewed and stable  Pain management: adequate  Airway patency: patent    PONV status at discharge: No PONV  Anesthetic complications: no      Cardiovascular status: blood pressure returned to baseline  Respiratory status: spontaneous ventilation  Hydration status: euvolemic  Follow-up not needed.          Vitals Value Taken Time   /75 11/16/20 1000   Temp 36.9 °C (98.5 °F) 11/16/20 0910   Pulse 72 11/16/20 1000   Resp 10 11/16/20 1000   SpO2 96 % 11/16/20 1000   Vitals shown include unvalidated device data.      No case tracking events are documented in the log.      Pain/Valeria Score: Valeria Score: 9 (11/16/2020  9:50 AM)

## 2020-11-16 NOTE — OP NOTE
Date of surgery:  November 16, 2020    Staff surgeon:  Dr. Jeevan Andrea    Assistant:Humphrey Block RNFA    Preoperative diagnosis:  Bilateral inguinal hernia    Postoperative diagnosis:  The same    Procedure:  Robotic Bilateral inguinal hernia repair with mesh    Anesthesia; general    Indication for procedure:  Pleasant 60-year-old gentleman presented with lower abdominal pain.  Patient had a CT scan for evaluation with the only significant finding being that of bilateral fat containing inguinal hernias.  Given this he was recommended to have robotic repair of the hernia defect.    Description of procedure:  Following signing informed consent patient taken the operating room placed supine position.  General endotracheal anesthesia was administered without event.  Levy catheter was inserted.  The abdomen was prepped and draped in standard fashion and appropriate time-out procedure was performed.  A vertical incision was made above the umbilicus carried out the deep dermal tissue.  Veress needle was used to enter the abdominal cavity establish a pneumoperitoneum to 15 mm of mercury.  Optiview trocar was then placed under direct visualization.  Next 2 8 mm robotic trocars were placed in the midclavicular line bilaterally.  Patient is then placed in Trendelenburg.  Evaluation does demonstrate fat containing hernia on the left as well as on the right.  I began on the right side a score the peritoneum at the level of ASIS.  I dissected the preperitoneal space.  I continued my dissection down to the space of Retzius.  There is a small direct inguinal hernia containing fat which was reduced uneventfully.  Spermatic cord was then identified and dissected.  Very small indirect inguinal hernia appreciated.  There is cord lipoma which was reduced and  from the cord uneventfully.  I closed the direct defect with a V lock suture.  Medium Bard 3D max mesh was placed uneventfully.  I next score the peritoneum on the  left side and dissected the preperitoneal space.  Dissection is carried down to the space of Retzius.  Again a cord lipoma was  from the spermatic cord.  Patient does have a moderate-sized indirect inguinal hernia accompanying the cord.  This is separate from the cord all the way down to the peritoneal inlet.  I then irrigated sure adequate hemostasis.  Large Bard 3DMax mesh was placed into the preperitoneal space.  It was secured in the appropriate position.  I next closed the peritoneum on both sides with a running strata fix suture without event.  I next remove all trocars under direct visualization.  The fascia above the umbilicus was closed with 0 Vicryl suture.  Skin incisions closed with 4 0 monocryl.  Patient was extubated and taken to recovery room stable condition.  There no immediate complications.  Blood loss was 25 cc

## 2020-11-16 NOTE — PLAN OF CARE
Pt transferred to phase II. VSS, robotic sites x 3 to abdomen cdi, pain tolerable, nausea resolved after Phenergan given, due to void. Report given to JONATHAN Barros.

## 2020-11-16 NOTE — PLAN OF CARE
Patient received from PACU via stretcher.  VSS.  NAD noted.  Girlfriend at bedside.  IVF w/o to promote urine production.

## 2020-11-16 NOTE — BRIEF OP NOTE
[facility]  Brief Operative Note    Surgery Date: 11/16/2020     Surgeon(s) and Role:     * Jeevan Andrea MD - Primary    Assisting Surgeon: Nadia VO    Pre-op Diagnosis:  Bilateral recurrent inguinal hernia without obstruction or gangrene [K40.21]    Post-op Diagnosis:  Post-Op Diagnosis Codes:     * Bilateral recurrent inguinal hernia without obstruction or gangrene [K40.21]    Procedure(s) (LRB):  ROBOTIC REPAIR, HERNIA, INGUINAL (Bilateral)    Anesthesia: General    Description of the findings of the procedure(s): Bilateral inguinal hernia.    L: moderate indirect inguinal hernia  R: Small direct and indirect.      Estimated Blood Loss: 5 mL         Specimens:   Specimen (12h ago, onward)    None            Discharge Note    OUTCOME: Patient tolerated treatment/procedure well without complication and is now ready for discharge.    DISPOSITION: Home or Self Care    FINAL DIAGNOSIS:  Bilateral recurrent inguinal hernia without obstruction or gangrene    FOLLOWUP: In clinic    DISCHARGE INSTRUCTIONS:    Discharge Procedure Orders   Diet general     Lifting restrictions     Call MD for:  extreme fatigue     Call MD for:  persistent dizziness or light-headedness     Call MD for:  hives     Call MD for:  difficulty breathing, headache or visual disturbances     Call MD for:  severe uncontrolled pain     Call MD for:  temperature >100.4     Call MD for:  persistent nausea and vomiting     Call MD for:  redness, tenderness, or signs of infection (pain, swelling, redness, odor or green/yellow discharge around incision site)     Remove dressing in 48 hours

## 2020-11-16 NOTE — PLAN OF CARE
Patient states does not have the urge to void yet.  IVF continue to infuse w/o to promote urine production.

## 2020-11-16 NOTE — DISCHARGE INSTRUCTIONS
"Discharge Instructions: After Your Surgery/Procedure  Youve just had surgery. During surgery you were given medicine called anesthesia to keep you relaxed and free of pain. After surgery you may have some pain or nausea. This is common. Here are some tips for feeling better and getting well after surgery.     Stay on schedule with your medication.   Going home  Your doctor or nurse will show you how to take care of yourself when you go home. He or she will also answer your questions. Have an adult family member or friend drive you home.      For your safety we recommend these precaution for the first 24 hours after your procedure:  · Do not drive or use heavy equipment.  · Do not make important decisions or sign legal papers.  · Do not drink alcohol.  · Have someone stay with you, if needed. He or she can watch for problems and help keep you safe.  · Your concentration, balance, coordination, and judgement may be impaired for many hours after anesthesia.  Use caution when ambulating or standing up.     · You may feel weak and "washed out" after anesthesia and surgery.      Subtle residual effects of general anesthesia or sedation with regional / local anesthesia can last more than 24 hours.  Rest for the remainder of the day or longer if your Doctor/Surgeon has advised you to do so.  Although you may feel normal within the first 24 hours, your reflexes and mental ability may be impaired without you realizing it.  You may feel dizzy, lightheaded or sleepy for 24 hours or longer.      Be sure to go to all follow-up visits with your doctor. And rest after your surgery for as long as your doctor tells you to.  Coping with pain  If you have pain after surgery, pain medicine will help you feel better. Take it as told, before pain becomes severe. Also, ask your doctor or pharmacist about other ways to control pain. This might be with heat, ice, or relaxation. And follow any other instructions your surgeon or nurse gives " you.  Tips for taking pain medicine  To get the best relief possible, remember these points:  · Pain medicines can upset your stomach. Taking them with a little food may help.  · Most pain relievers taken by mouth need at least 20 to 30 minutes to start to work.  · Taking medicine on a schedule can help you remember to take it. Try to time your medicine so that you can take it before starting an activity. This might be before you get dressed, go for a walk, or sit down for dinner.  · Constipation is a common side effect of pain medicines. Call your doctor before taking any medicines such as laxatives or stool softeners to help ease constipation. Also ask if you should skip any foods. Drinking lots of fluids and eating foods such as fruits and vegetables that are high in fiber can also help. Remember, do not take laxatives unless your surgeon has prescribed them.  · Drinking alcohol and taking pain medicine can cause dizziness and slow your breathing. It can even be deadly. Do not drink alcohol while taking pain medicine.  · Pain medicine can make you react more slowly to things. Do not drive or run machinery while taking pain medicine.  Your health care provider may tell you to take acetaminophen to help ease your pain. Ask him or her how much you are supposed to take each day. Acetaminophen or other pain relievers may interact with your prescription medicines or other over-the-counter (OTC) drugs. Some prescription medicines have acetaminophen and other ingredients. Using both prescription and OTC acetaminophen for pain can cause you to overdose. Read the labels on your OTC medicines with care. This will help you to clearly know the list of ingredients, how much to take, and any warnings. It may also help you not take too much acetaminophen. If you have questions or do not understand the information, ask your pharmacist or health care provider to explain it to you before you take the OTC medicine.  Managing  nausea  Some people have an upset stomach after surgery. This is often because of anesthesia, pain, or pain medicine, or the stress of surgery. These tips will help you handle nausea and eat healthy foods as you get better. If you were on a special food plan before surgery, ask your doctor if you should follow it while you get better. These tips may help:  · Do not push yourself to eat. Your body will tell you when to eat and how much.  · Start off with clear liquids and soup. They are easier to digest.  · Next try semi-solid foods, such as mashed potatoes, applesauce, and gelatin, as you feel ready.  · Slowly move to solid foods. Dont eat fatty, rich, or spicy foods at first.  · Do not force yourself to have 3 large meals a day. Instead eat smaller amounts more often.  · Take pain medicines with a small amount of solid food, such as crackers or toast, to avoid nausea.     Call your surgeon if  · You still have pain an hour after taking medicine. The medicine may not be strong enough.  · You feel too sleepy, dizzy, or groggy. The medicine may be too strong.  · You have side effects like nausea, vomiting, or skin changes, such as rash, itching, or hives.       If you have obstructive sleep apnea  You were given anesthesia medicine during surgery to keep you comfortable and free of pain. After surgery, you may have more apnea spells because of this medicine and other medicines you were given. The spells may last longer than usual.   At home:  · Keep using the continuous positive airway pressure (CPAP) device when you sleep. Unless your health care provider tells you not to, use it when you sleep, day or night. CPAP is a common device used to treat obstructive sleep apnea.  · Talk with your provider before taking any pain medicine, muscle relaxants, or sedatives. Your provider will tell you about the possible dangers of taking these medicines.  © 4712-4317 [employer]. [address] [place] [address]  [place] [address]. All rights reserved. This information is not intended as a substitute for professional medical care. Always follow your healthcare professional's instructions.    Post op instructions for prevention of DVT  What is deep vein thrombosis?  Deep vein thrombosis (DVT) is the medical term for blood clots in the deep veins of the leg.  These blood clots can be dangerous.  A DVT can block a blood vessel and keep blood from getting where it needs to go.  Another problem is that the clot can travel to other parts of the body such as the lungs.  A clot that travels to the lungs is called a pulmonary embolus (PE) and can cause serious problems with breathing which can lead to death.  Am I at risk for DVT/PE?  If you are not very active, you are at risk of DVT.  Anyone confined to bed, sitting for long periods of time, recovering from surgery, etc. increases the risk of DVT.  Other risk factors are cancer diagnosis, certain medications, estrogen replacement in any form,older age, obesity, pregnancy, smoking, history of clotting disorders, and dehydration.  How will I know if I have a DVT?   Swelling in the lower leg   Pain   Warmth, redness, hardness or bulging of the vein  If you have any of these symptoms, call your doctors office right away.  Some people will not have any symptoms until the clot moves to the lungs.  What are the symptoms of a PE?   Panting, shortness of breath, or trouble breathing   Sharp, knife-like chest pain when you breathe   Coughing or coughing up blood   Rapid heartbeat  If you have any of these symptoms or get worse quickly, call 911 for emergency treatment.  How can I prevent a DVT?   Avoid long periods of inactivity and dont cross your legs--get up and walk around every hour or so.   Stay active--walking after surgery is highly encouraged.  This means you should get out of the house and walk in the neighborhood.  Going up and down stairs will not impair healing (unless advised  against such activity by your doctor).     Drink plenty of noncaffeinated, nonalcoholic fluids each day to prevent dehydration.   Wear special support stockings, if they have been advised by your doctor.   If you travel, stop at least once an hour and walk around.   Avoid smoking (assistance with stopping is available through your healthcare provider)  Always notify your doctor if you are not able to follow the post operative instructions that are given to you at the time of discharge.  It may be necessary to prescribe one of the medications available to prevent DVT.    We hope your stay was comfortable as you heal now, mend and rest.    For we have enjoyed taking care of you by giving your our best.    And as you get better, by regaining your health and strength;   We count it as a privilege to have served you and hope your time at [facility] was well spent.      Thank  You!!!

## 2020-11-16 NOTE — ANESTHESIA PROCEDURE NOTES
Intubation  Performed by: Mian Perez CRNA  Authorized by: August Conde MD     Intubation:     Induction:  Intravenous    Intubated:  Postinduction    Mask Ventilation:  Easy with oral airway    Attempts:  1    Attempted By:  CRNA    Method of Intubation:  Direct    Blade:  Sage 2    Laryngeal View Grade: Grade IIA - cords partially seen      Difficult Airway Encountered?: No      Complications:  None    Airway Device:  Oral endotracheal tube    Airway Device Size:  7.5    Style/Cuff Inflation:  Cuffed    Tube secured:  23    Secured at:  The lips    Placement Verified By:  Capnometry    Complicating Factors:  None    Findings Post-Intubation:  BS equal bilateral

## 2020-11-16 NOTE — TRANSFER OF CARE
Anesthesia Transfer of Care Note    Patient: Enrique Yousif    Procedure(s) Performed: Procedure(s) (LRB):  ROBOTIC REPAIR, HERNIA, INGUINAL (Bilateral)    Patient location: PACU    Anesthesia Type: general    Transport from OR: Transported from OR on 6-10 L/min O2 by face mask with adequate spontaneous ventilation    Post pain: adequate analgesia    Post assessment: tolerated procedure well and no apparent anesthetic complications    Post vital signs: stable    Level of consciousness: sedated    Nausea/Vomiting: no nausea/vomiting    Complications: none    Transfer of care protocol was followed      Last vitals:   Visit Vitals  /74   Pulse 82   Temp 36.9 °C (98.5 °F) (Skin)   Resp 10   SpO2 100%

## 2020-11-16 NOTE — ANESTHESIA PREPROCEDURE EVALUATION
11/16/2020  Enrique Yousif is a 60 y.o., male.    Patient Active Problem List   Diagnosis    Bilateral recurrent inguinal hernia without obstruction or gangrene       Past Surgical History:   Procedure Laterality Date    APPENDECTOMY  23yrs old    CYSTOSCOPY WITH CALCULUS EXTRACTION Left 4/23/2020    Procedure: CYSTOSCOPY, WITH CALCULUS REMOVAL;  Surgeon: Derian Aguillon MD;  Location: [facility];  Service: Urology;  Laterality: Left;    EXTRACORPOREAL SHOCK WAVE LITHOTRIPSY Left 5/7/2020    Procedure: LITHOTRIPSY, ESWL;  Surgeon: Derian Aguillon MD;  Location: [facility] OR;  Service: Urology;  Laterality: Left;    EXTRACORPOREAL SHOCK WAVE LITHOTRIPSY Left 6/4/2020    Procedure: LITHOTRIPSY, ESWL;  Surgeon: Derian Aguillon MD;  Location: [facility];  Service: Urology;  Laterality: Left;    Right Knee Orthoscopic Surgery  2014    Right Shoulder Rotator Cuff  2014    TONSILLECTOMY  Age 33yrs old    URETEROSCOPY N/A 4/23/2020    Procedure: URETEROSCOPY;  Surgeon: Derian Aguillon MD;  Location: [facility];  Service: Urology;  Laterality: N/A;        Tobacco Use:  The patient  reports that he has never smoked. He has never used smokeless tobacco.     Results for orders placed or performed during the hospital encounter of 11/13/20   EKG 12-LEAD    Collection Time: 11/13/20  9:05 AM    Narrative    Test Reason : K40.21,    Vent. Rate : 083 BPM     Atrial Rate : 083 BPM     P-R Int : 186 ms          QRS Dur : 122 ms      QT Int : 374 ms       P-R-T Axes : 075 068 066 degrees     QTc Int : 439 ms    Normal sinus rhythm  Right bundle branch block  Abnormal ECG  When compared with ECG of 22-APR-2020 15:42,  No significant change was found    Referred By:  CEDRICK           Confirmed By:              Lab Results   Component Value Date    WBC 6.81 11/13/2020    HGB 13.7 (L) 11/13/2020    HCT 40.1  11/13/2020    MCV 90 11/13/2020     11/13/2020     BMP  Lab Results   Component Value Date     11/13/2020    K 3.9 11/13/2020     11/13/2020    CO2 28 11/13/2020    BUN 17 11/13/2020    CREATININE 1.0 11/13/2020    CALCIUM 9.2 11/13/2020    ANIONGAP 8 11/13/2020    ESTGFRAFRICA >60 11/13/2020    EGFRNONAA >60 11/13/2020             Pre-op Assessment    I have reviewed the Patient Summary Reports.     I have reviewed the Nursing Notes. I have reviewed the NPO Status.   I have reviewed the Medications.     Review of Systems  Anesthesia Hx:  No problems with previous Anesthesia  History of prior surgery of interest to airway management or planning: Denies Family Hx of Anesthesia complications.   Denies Personal Hx of Anesthesia complications. (reports hx of slow awaking after knee procedure in remote past, no problems with recent anesthetics)   Social:  Non-Smoker    Hematology/Oncology:  Hematology Normal        Cardiovascular:  Cardiovascular Normal  ECG has been reviewed.    Pulmonary:  Pulmonary Normal    Renal/:   renal calculi    Hepatic/GI:  Hepatic/GI Normal IH   Musculoskeletal:  Musculoskeletal Normal    Neurological:  Neurology Normal    Endocrine:  Endocrine Normal        Physical Exam  General:  Well nourished    Airway/Jaw/Neck:  Airway Findings: Mouth Opening: Normal Tongue: Normal  General Airway Assessment: Adult  Mallampati: II  TM Distance: Normal, at least 6 cm  Jaw/Neck Findings:  Neck ROM: Normal ROM       Chest/Lungs:  Chest/Lungs Findings: Clear to auscultation, Normal Respiratory Rate     Heart/Vascular:  Heart Findings: Rate: Normal  Rhythm: Regular Rhythm  Sounds: Normal     Abdomen:  Abdomen Findings: Normal    Musculoskeletal:  Musculoskeletal Findings: Normal   Skin:  Skin Findings: Normal    Mental Status:  Mental Status Findings:  Cooperative, Alert and Oriented         Anesthesia Plan  Type of Anesthesia, risks & benefits discussed:  Anesthesia Type:   general  Patient's Preference:   Intra-op Monitoring Plan: standard ASA monitors  Intra-op Monitoring Plan Comments:   Post Op Pain Control Plan: multimodal analgesia  Post Op Pain Control Plan Comments:   Induction:   IV  Beta Blocker:  Patient is not currently on a Beta-Blocker (No further documentation required).       Informed Consent: Patient understands risks and agrees with Anesthesia plan.  Questions answered. Anesthesia consent signed with patient.  ASA Score: 2     Day of Surgery Review of History & Physical: I have interviewed and examined the patient. I have reviewed the patient's H&P dated:  There are no significant changes.          Ready For Surgery From Anesthesia Perspective.

## 2020-11-17 ENCOUNTER — TELEPHONE (OUTPATIENT)
Dept: SURGERY | Facility: CLINIC | Age: 60
End: 2020-11-17

## 2020-11-17 VITALS
RESPIRATION RATE: 16 BRPM | OXYGEN SATURATION: 97 % | DIASTOLIC BLOOD PRESSURE: 70 MMHG | HEART RATE: 78 BPM | SYSTOLIC BLOOD PRESSURE: 138 MMHG | TEMPERATURE: 98 F

## 2020-11-17 NOTE — TELEPHONE ENCOUNTER
----- Message from Linda Manzano sent at 11/17/2020  9:00 AM CST -----  Type: Needs Medical Advice    Who Called:  Kalpana Corona with [employer]  Best Call Back Number: [phone]  Additional Information:  Calling back concerning [facility] paperwork that was faxed on November 2nd/has not received response. Will refax.

## 2020-11-19 ENCOUNTER — TELEPHONE (OUTPATIENT)
Dept: SURGERY | Facility: CLINIC | Age: 60
End: 2020-11-19

## 2020-11-19 NOTE — TELEPHONE ENCOUNTER
----- Message from Joan Bernardo sent at 11/19/2020 12:10 PM CST -----  Contact: Patient  Needs Medical Advice  Who Called: Patient  Symptoms:   How Long Has the Patient had these Symptoms:   Pharmacy Name and Phone #:   Best Contact #: [phone]    Additional Information: Pt requesting call back regarding work release. Pt had procedure Monday 11/16 and directions said to not drive for 24 hours. Pt states his job is requesting paperwork stating he can come back to work. Fax for pt's work is [phone] and [phone]. Please fax a release to both of these numbers. 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 Please call with any questions.

## 2021-05-17 ENCOUNTER — OCCUPATIONAL HEALTH (OUTPATIENT)
Dept: URGENT CARE | Facility: CLINIC | Age: 61
End: 2021-05-17

## 2021-05-17 PROCEDURE — 80306 DRUG TEST PRSMV INSTRMNT: CPT | Mod: S$GLB,,, | Performed by: EMERGENCY MEDICINE

## 2021-05-17 PROCEDURE — 99499 PR PHYSICAL - DOT/CDL: ICD-10-PCS | Mod: S$GLB,,, | Performed by: EMERGENCY MEDICINE

## 2021-05-17 PROCEDURE — 80306 PR DOT DRUG SCREENS: ICD-10-PCS | Mod: S$GLB,,, | Performed by: EMERGENCY MEDICINE

## 2021-05-17 PROCEDURE — 99499 UNLISTED E&M SERVICE: CPT | Mod: S$GLB,,, | Performed by: EMERGENCY MEDICINE

## 2023-02-13 DIAGNOSIS — M54.12 BRACHIAL NEURITIS: Primary | ICD-10-CM

## (undated) DEVICE — KIT ROBOTIC 3 ARM DA VINCI SI

## (undated) DEVICE — NDL SAFETY 21G X 1 1/2 ECLPSE

## (undated) DEVICE — UNDERGLOVE BIOGEL PI MICRO BLUE SZ 6.5

## (undated) DEVICE — SCRUB BACTOSHIELD 134439

## (undated) DEVICE — SLEEVE SCD EXPRESS CALF MEDIUM

## (undated) DEVICE — TUBING CYSTO DOUBLE 654301

## (undated) DEVICE — GLOVE SURG ULTRA TOUCH 7

## (undated) DEVICE — OBTURATOR 8MM BLADELESS

## (undated) DEVICE — UNDERGLOVE BIOGEL PI SZ 6.5 LF

## (undated) DEVICE — NDL INSUF ULTRA VERESS 120MM

## (undated) DEVICE — STRAP TABLE 5X72 M5173

## (undated) DEVICE — SOLUTION H2O IRRIGATION 3000ML R8006

## (undated) DEVICE — GLOVE SURG ULTRA TOUCH 7.5

## (undated) DEVICE — SUT CTD VICRYL VIL BR SH 27

## (undated) DEVICE — PACK CYSTOSCOPY III

## (undated) DEVICE — Device

## (undated) DEVICE — COVER TIP CURVED SCISSORS XI

## (undated) DEVICE — GLOVE #7.5 BIOGEL 30475

## (undated) DEVICE — SOL CLEARIFY VISUALIZATION LAP

## (undated) DEVICE — ELECTRODE REM PLYHSV RETURN 9

## (undated) DEVICE — SUT STRATAFIX PDO 2-0 SH

## (undated) DEVICE — SOLUTION IRRI H2O BOTTLE 1000ML

## (undated) DEVICE — SEALS

## (undated) DEVICE — SUCTION IRRIGATR ENDOWRIST ONE

## (undated) DEVICE — GUIDEWIRE URO STRGHT 3CM TIP.035X150CM

## (undated) DEVICE — PACK CUSTOM UNIV BASIN SLI

## (undated) DEVICE — TROCAR ENDOPATH XCEL 12X100MM

## (undated) DEVICE — DRAPE ABDOMINAL TIBURON 14X11

## (undated) DEVICE — UNDERGLOVES BIOGEL PI SZ 7 LF

## (undated) DEVICE — KIT WING PAD POSITIONING

## (undated) DEVICE — SEE MEDLINE ITEM 157117

## (undated) DEVICE — SUT 0 VICRYL / UR6 (J603)

## (undated) DEVICE — PACK BASIC

## (undated) DEVICE — SEE MEDLINE ITEM 152622

## (undated) DEVICE — SYR ONLY LUER LOCK 20CC

## (undated) DEVICE — CLOSURE SKIN STERI STRIP 1/2X4

## (undated) DEVICE — TUBING SUCTION 12' ST2003

## (undated) DEVICE — GOWN B1 X-LG X-LONG

## (undated) DEVICE — SUT V-LOC 180 2-0 GS-22 9IN

## (undated) DEVICE — SOL WATER STRL IRR 1000ML

## (undated) DEVICE — SYR 10CC LUER LOCK

## (undated) DEVICE — STRAP OR TABLE 5IN X 72IN

## (undated) DEVICE — GEMINI BASK 3 WIRE 3FX120CMX11MM

## (undated) DEVICE — CORD BIPOLAR 12 FOOT

## (undated) DEVICE — SOL ELECTROLUBE ANTI-STIC

## (undated) DEVICE — GLOVE SURGEONS ULTRA TOUCH 6.5

## (undated) DEVICE — SOL IRR NACL .9% 3000ML

## (undated) DEVICE — SOLUTION IRRI NS BOTTLE 1000ML R5200-01

## (undated) DEVICE — SET TUBE PNEUMOCLEAR SE HI FLO

## (undated) DEVICE — SUT MONOCRYL 4-0 PS-2

## (undated) DEVICE — CLIPPER BLADE MOD 4406 (CAREF)

## (undated) DEVICE — APPLICATOR CHLORAPREP ORN 26ML